# Patient Record
Sex: FEMALE | Race: WHITE | NOT HISPANIC OR LATINO | Employment: FULL TIME
[De-identification: names, ages, dates, MRNs, and addresses within clinical notes are randomized per-mention and may not be internally consistent; named-entity substitution may affect disease eponyms.]

---

## 2017-06-10 ENCOUNTER — HOSPITAL (OUTPATIENT)
Dept: OTHER | Age: 32
End: 2017-06-10
Attending: EMERGENCY MEDICINE

## 2017-06-10 LAB
ALBUMIN SERPL-MCNC: 4.5 GM/DL (ref 3.6–5.1)
ALBUMIN/GLOB SERPL: 1.2 {RATIO} (ref 1–2.4)
ALP SERPL-CCNC: 121 UNIT/L (ref 45–117)
ALT SERPL-CCNC: 23 UNIT/L
ANALYZER ANC (IANC): ABNORMAL
ANION GAP SERPL CALC-SCNC: 16 MMOL/L (ref 10–20)
APPEARANCE UR: CLEAR
AST SERPL-CCNC: 19 UNIT/L
BACTERIA #/AREA URNS HPF: ABNORMAL /HPF
BASOPHILS # BLD: 0 THOUSAND/MCL (ref 0–0.3)
BASOPHILS NFR BLD: 0 %
BILIRUB SERPL-MCNC: 0.7 MG/DL (ref 0.2–1)
BILIRUB UR QL: NEGATIVE
BUN SERPL-MCNC: 16 MG/DL (ref 6–20)
BUN/CREAT SERPL: 23 (ref 7–25)
CALCIUM SERPL-MCNC: 9.7 MG/DL (ref 8.4–10.2)
CHLORIDE: 100 MMOL/L (ref 98–107)
CO2 SERPL-SCNC: 27 MMOL/L (ref 21–32)
COLOR UR: YELLOW
CREAT SERPL-MCNC: 0.71 MG/DL (ref 0.51–0.95)
DIFFERENTIAL METHOD BLD: ABNORMAL
EOSINOPHIL # BLD: 0 THOUSAND/MCL (ref 0.1–0.5)
EOSINOPHIL NFR BLD: 0 %
ERYTHROCYTE [DISTWIDTH] IN BLOOD: 12.4 % (ref 11–15)
GLOBULIN SER-MCNC: 3.9 GM/DL (ref 2–4)
GLUCOSE SERPL-MCNC: 86 MG/DL (ref 65–99)
GLUCOSE UR-MCNC: NEGATIVE MG/DL
HCG SERPL QL: NEGATIVE
HEMATOCRIT: 43.2 % (ref 36–46.5)
HGB BLD-MCNC: 14.5 GM/DL (ref 12–15.5)
HGB UR QL: ABNORMAL
HYALINE CASTS #/AREA URNS LPF: ABNORMAL /LPF (ref 0–5)
KETONES UR-MCNC: NEGATIVE MG/DL
LEUKOCYTE ESTERASE UR QL STRIP: ABNORMAL
LIPASE SERPL-CCNC: 378 UNIT/L (ref 73–393)
LYMPHOCYTES # BLD: 2.7 THOUSAND/MCL (ref 1–4.8)
LYMPHOCYTES NFR BLD: 19 %
MCH RBC QN AUTO: 29.5 PG (ref 26–34)
MCHC RBC AUTO-ENTMCNC: 33.6 GM/DL (ref 32–36.5)
MCV RBC AUTO: 88 FL (ref 78–100)
MONOCYTES # BLD: 1.2 THOUSAND/MCL (ref 0.3–0.9)
MONOCYTES NFR BLD: 8 %
NEUTROPHILS # BLD: 10.3 THOUSAND/MCL (ref 1.8–7.7)
NEUTROPHILS NFR BLD: 73 %
NEUTS SEG NFR BLD: ABNORMAL %
NITRITE UR QL: NEGATIVE
PERCENT NRBC: ABNORMAL
PH UR: 7 UNIT (ref 5–7)
PLATELET # BLD: 305 THOUSAND/MCL (ref 140–450)
POTASSIUM SERPL-SCNC: 4 MMOL/L (ref 3.4–5.1)
PROT SERPL-MCNC: 8.4 GM/DL (ref 6.4–8.2)
PROT UR QL: NEGATIVE MG/DL
RBC # BLD: 4.91 MILLION/MCL (ref 4–5.2)
RBC #/AREA URNS HPF: ABNORMAL /HPF (ref 0–3)
SODIUM SERPL-SCNC: 139 MMOL/L (ref 135–145)
SP GR UR: <1.005 (ref 1–1.03)
SPECIMEN SOURCE: ABNORMAL
SQUAMOUS #/AREA URNS HPF: ABNORMAL /HPF (ref 0–5)
UROBILINOGEN UR QL: 0.2 MG/DL (ref 0–1)
WBC # BLD: 14.2 THOUSAND/MCL (ref 4.2–11)
WBC #/AREA URNS HPF: ABNORMAL /HPF (ref 0–5)

## 2017-06-13 LAB
ALBUMIN SERPL-MCNC: 3.9 GM/DL (ref 3.6–5.1)
ALBUMIN/GLOB SERPL: 0.8 {RATIO} (ref 1–2.4)
ALP SERPL-CCNC: 99 UNIT/L (ref 45–117)
ALT SERPL-CCNC: 21 UNIT/L
AMORPH SED URNS QL MICRO: ABNORMAL
ANALYZER ANC (IANC): ABNORMAL
ANION GAP SERPL CALC-SCNC: 12 MMOL/L (ref 10–20)
APPEARANCE UR: ABNORMAL
AST SERPL-CCNC: 18 UNIT/L
BACTERIA #/AREA URNS HPF: ABNORMAL /HPF
BASOPHILS # BLD: 0 THOUSAND/MCL (ref 0–0.3)
BASOPHILS NFR BLD: 0 %
BILIRUB SERPL-MCNC: 0.6 MG/DL (ref 0.2–1)
BILIRUB UR QL: NEGATIVE
BUN SERPL-MCNC: 13 MG/DL (ref 6–20)
BUN/CREAT SERPL: 17 (ref 7–25)
CALCIUM SERPL-MCNC: 9 MG/DL (ref 8.4–10.2)
CAOX CRY URNS QL MICRO: ABNORMAL
CHLORIDE: 100 MMOL/L (ref 98–107)
CO2 SERPL-SCNC: 29 MMOL/L (ref 21–32)
COLOR UR: ABNORMAL
CREAT SERPL-MCNC: 0.78 MG/DL (ref 0.51–0.95)
DIFFERENTIAL METHOD BLD: ABNORMAL
EOSINOPHIL # BLD: 0 THOUSAND/MCL (ref 0.1–0.5)
EOSINOPHIL NFR BLD: 0 %
EPITH CASTS #/AREA URNS LPF: ABNORMAL /[LPF]
ERYTHROCYTE [DISTWIDTH] IN BLOOD: 12.2 % (ref 11–15)
FATTY CASTS #/AREA URNS LPF: ABNORMAL /[LPF]
GLOBULIN SER-MCNC: 4.6 GM/DL (ref 2–4)
GLUCOSE SERPL-MCNC: 101 MG/DL (ref 65–99)
GLUCOSE UR-MCNC: NEGATIVE MG/DL
GRAN CASTS #/AREA URNS LPF: ABNORMAL /[LPF]
HCG POINT OF CARE (5HGRST): NEGATIVE
HEMATOCRIT: 39.9 % (ref 36–46.5)
HG POINT OF CARE QC: NORMAL
HGB BLD-MCNC: 13.4 GM/DL (ref 12–15.5)
HGB UR QL: ABNORMAL
HYALINE CASTS #/AREA URNS LPF: ABNORMAL /LPF (ref 0–5)
KETONES UR-MCNC: NEGATIVE MG/DL
LEUKOCYTE ESTERASE UR QL STRIP: ABNORMAL
LYMPHOCYTES # BLD: 2.3 THOUSAND/MCL (ref 1–4.8)
LYMPHOCYTES NFR BLD: 16 %
MCH RBC QN AUTO: 29.8 PG (ref 26–34)
MCHC RBC AUTO-ENTMCNC: 33.6 GM/DL (ref 32–36.5)
MCV RBC AUTO: 88.9 FL (ref 78–100)
MIXED CELL CASTS #/AREA URNS LPF: ABNORMAL /[LPF]
MONOCYTES # BLD: 1.1 THOUSAND/MCL (ref 0.3–0.9)
MONOCYTES NFR BLD: 7 %
MUCOUS THREADS URNS QL MICRO: ABNORMAL
NEUTROPHILS # BLD: 11.2 THOUSAND/MCL (ref 1.8–7.7)
NEUTROPHILS NFR BLD: 77 %
NEUTS SEG NFR BLD: ABNORMAL %
NITRITE UR QL: NEGATIVE
PERCENT NRBC: ABNORMAL
PH UR: 6 UNIT (ref 5–7)
PLATELET # BLD: 293 THOUSAND/MCL (ref 140–450)
POTASSIUM SERPL-SCNC: 3.6 MMOL/L (ref 3.4–5.1)
PROT SERPL-MCNC: 8.5 GM/DL (ref 6.4–8.2)
PROT UR QL: NEGATIVE MG/DL
RBC # BLD: 4.49 MILLION/MCL (ref 4–5.2)
RBC #/AREA URNS HPF: ABNORMAL /HPF (ref 0–3)
RBC CASTS #/AREA URNS LPF: ABNORMAL /[LPF]
RENAL EPI CELLS #/AREA URNS HPF: ABNORMAL /[HPF]
SODIUM SERPL-SCNC: 137 MMOL/L (ref 135–145)
SP GR UR: 1 (ref 1–1.03)
SPECIMEN SOURCE: ABNORMAL
SPERM URNS QL MICRO: ABNORMAL
SQUAMOUS #/AREA URNS HPF: ABNORMAL /HPF (ref 0–5)
T VAGINALIS URNS QL MICRO: ABNORMAL
TRI-PHOS CRY URNS QL MICRO: ABNORMAL
URATE CRY URNS QL MICRO: ABNORMAL
URINE REFLEX: ABNORMAL
URNS CMNT MICRO: ABNORMAL
UROBILINOGEN UR QL: 0.2 MG/DL (ref 0–1)
WAXY CASTS #/AREA URNS LPF: ABNORMAL /[LPF]
WBC # BLD: 14.7 THOUSAND/MCL (ref 4.2–11)
WBC #/AREA URNS HPF: ABNORMAL /HPF (ref 0–5)
WBC CASTS #/AREA URNS LPF: ABNORMAL /[LPF]
YEAST HYPHAE URNS QL MICRO: ABNORMAL
YEAST URNS QL MICRO: ABNORMAL

## 2017-06-14 ENCOUNTER — HOSPITAL (OUTPATIENT)
Dept: OTHER | Age: 32
End: 2017-06-14
Attending: INTERNAL MEDICINE

## 2017-06-14 LAB
ANALYZER ANC (IANC): ABNORMAL
ANION GAP SERPL CALC-SCNC: 16 MMOL/L (ref 10–20)
BASOPHILS # BLD: 0 THOUSAND/MCL (ref 0–0.3)
BASOPHILS NFR BLD: 0 %
BUN SERPL-MCNC: 10 MG/DL (ref 6–20)
BUN/CREAT SERPL: 14 (ref 7–25)
CALCIUM SERPL-MCNC: 8.4 MG/DL (ref 8.4–10.2)
CHLORIDE: 106 MMOL/L (ref 98–107)
CO2 SERPL-SCNC: 25 MMOL/L (ref 21–32)
CREAT SERPL-MCNC: 0.71 MG/DL (ref 0.51–0.95)
DIFFERENTIAL METHOD BLD: ABNORMAL
EOSINOPHIL # BLD: 0.1 THOUSAND/MCL (ref 0.1–0.5)
EOSINOPHIL NFR BLD: 1 %
ERYTHROCYTE [DISTWIDTH] IN BLOOD: 12.3 % (ref 11–15)
GLUCOSE SERPL-MCNC: 92 MG/DL (ref 65–99)
HEMATOCRIT: 36.2 % (ref 36–46.5)
HGB BLD-MCNC: 11.7 GM/DL (ref 12–15.5)
LYMPHOCYTES # BLD: 1.9 THOUSAND/MCL (ref 1–4.8)
LYMPHOCYTES NFR BLD: 21 %
MCH RBC QN AUTO: 28.9 PG (ref 26–34)
MCHC RBC AUTO-ENTMCNC: 32.3 GM/DL (ref 32–36.5)
MCV RBC AUTO: 89.4 FL (ref 78–100)
MONOCYTES # BLD: 0.8 THOUSAND/MCL (ref 0.3–0.9)
MONOCYTES NFR BLD: 9 %
NEUTROPHILS # BLD: 6.1 THOUSAND/MCL (ref 1.8–7.7)
NEUTROPHILS NFR BLD: 69 %
NEUTS SEG NFR BLD: ABNORMAL %
PERCENT NRBC: ABNORMAL
PLATELET # BLD: 252 THOUSAND/MCL (ref 140–450)
POTASSIUM SERPL-SCNC: 4.1 MMOL/L (ref 3.4–5.1)
RBC # BLD: 4.05 MILLION/MCL (ref 4–5.2)
SODIUM SERPL-SCNC: 143 MMOL/L (ref 135–145)
TSH SERPL-ACNC: 2.04 MCUNIT/ML (ref 0.35–5)
WBC # BLD: 8.8 THOUSAND/MCL (ref 4.2–11)

## 2017-06-15 LAB
ANALYZER ANC (IANC): ABNORMAL
ANION GAP SERPL CALC-SCNC: 12 MMOL/L (ref 10–20)
BUN SERPL-MCNC: 11 MG/DL (ref 6–20)
BUN/CREAT SERPL: 17 (ref 7–25)
CALCIUM SERPL-MCNC: 8.3 MG/DL (ref 8.4–10.2)
CHLORIDE: 103 MMOL/L (ref 98–107)
CO2 SERPL-SCNC: 27 MMOL/L (ref 21–32)
CREAT SERPL-MCNC: 0.63 MG/DL (ref 0.51–0.95)
ERYTHROCYTE [DISTWIDTH] IN BLOOD: 12.1 % (ref 11–15)
GLUCOSE SERPL-MCNC: 91 MG/DL (ref 65–99)
HEMATOCRIT: 35.8 % (ref 36–46.5)
HGB BLD-MCNC: 11.7 GM/DL (ref 12–15.5)
MAGNESIUM SERPL-MCNC: 1.9 MG/DL (ref 1.7–2.4)
MCH RBC QN AUTO: 29.1 PG (ref 26–34)
MCHC RBC AUTO-ENTMCNC: 32.7 GM/DL (ref 32–36.5)
MCV RBC AUTO: 89.1 FL (ref 78–100)
PHOSPHATE SERPL-MCNC: 4.3 MG/DL (ref 2.4–4.7)
PLATELET # BLD: 264 THOUSAND/MCL (ref 140–450)
POTASSIUM SERPL-SCNC: 3.6 MMOL/L (ref 3.4–5.1)
RBC # BLD: 4.02 MILLION/MCL (ref 4–5.2)
SODIUM SERPL-SCNC: 138 MMOL/L (ref 135–145)
WBC # BLD: 7.1 THOUSAND/MCL (ref 4.2–11)

## 2018-06-24 ENCOUNTER — DIAGNOSTIC TRANS (OUTPATIENT)
Dept: OTHER | Age: 33
End: 2018-06-24

## 2018-06-24 ENCOUNTER — HOSPITAL (OUTPATIENT)
Dept: OTHER | Age: 33
End: 2018-06-24
Attending: EMERGENCY MEDICINE

## 2018-06-24 LAB
ALBUMIN SERPL-MCNC: 4.2 GM/DL (ref 3.6–5.1)
ALBUMIN/GLOB SERPL: 1.1 {RATIO} (ref 1–2.4)
ALP SERPL-CCNC: 88 UNIT/L (ref 45–117)
ALT SERPL-CCNC: 20 UNIT/L
ANALYZER ANC (IANC): NORMAL
ANION GAP SERPL CALC-SCNC: 11 MMOL/L (ref 10–20)
AST SERPL-CCNC: 11 UNIT/L
BASOPHILS # BLD: 0 THOUSAND/MCL (ref 0–0.3)
BASOPHILS NFR BLD: 0 %
BILIRUB SERPL-MCNC: 0.7 MG/DL (ref 0.2–1)
BUN SERPL-MCNC: 12 MG/DL (ref 6–20)
BUN/CREAT SERPL: 18 (ref 7–25)
CALCIUM SERPL-MCNC: 9 MG/DL (ref 8.4–10.2)
CHLORIDE: 107 MMOL/L (ref 98–107)
CO2 SERPL-SCNC: 25 MMOL/L (ref 21–32)
CREAT SERPL-MCNC: 0.67 MG/DL (ref 0.51–0.95)
DIFFERENTIAL METHOD BLD: NORMAL
EOSINOPHIL # BLD: 0.1 THOUSAND/MCL (ref 0.1–0.5)
EOSINOPHIL NFR BLD: 1 %
ERYTHROCYTE [DISTWIDTH] IN BLOOD: 12.5 % (ref 11–15)
GLOBULIN SER-MCNC: 3.9 GM/DL (ref 2–4)
GLUCOSE SERPL-MCNC: 100 MG/DL (ref 65–99)
HCG POINT OF CARE (5HGRST): NEGATIVE
HEMATOCRIT: 41.4 % (ref 36–46.5)
HGB BLD-MCNC: 13.9 GM/DL (ref 12–15.5)
LYMPHOCYTES # BLD: 2.4 THOUSAND/MCL (ref 1–4.8)
LYMPHOCYTES NFR BLD: 27 %
MCH RBC QN AUTO: 30 PG (ref 26–34)
MCHC RBC AUTO-ENTMCNC: 33.6 GM/DL (ref 32–36.5)
MCV RBC AUTO: 89.2 FL (ref 78–100)
MONOCYTES # BLD: 0.7 THOUSAND/MCL (ref 0.3–0.9)
MONOCYTES NFR BLD: 8 %
NEUTROPHILS # BLD: 5.5 THOUSAND/MCL (ref 1.8–7.7)
NEUTROPHILS NFR BLD: 64 %
NEUTS SEG NFR BLD: NORMAL %
NRBC (NRBCRE): NORMAL
PLATELET # BLD: 257 THOUSAND/MCL (ref 140–450)
POTASSIUM SERPL-SCNC: 3.8 MMOL/L (ref 3.4–5.1)
PROT SERPL-MCNC: 8.1 GM/DL (ref 6.4–8.2)
RBC # BLD: 4.64 MILLION/MCL (ref 4–5.2)
SODIUM SERPL-SCNC: 139 MMOL/L (ref 135–145)
TROPONIN I SERPL HS-MCNC: <0.02 NG/ML
WBC # BLD: 8.7 THOUSAND/MCL (ref 4.2–11)

## 2020-03-20 ENCOUNTER — HOSPITAL (OUTPATIENT)
Dept: OTHER | Age: 35
End: 2020-03-20

## 2020-03-20 PROCEDURE — X1094 NO CHARGE VISIT: HCPCS | Performed by: EMERGENCY MEDICINE

## 2020-04-29 ENCOUNTER — HOSPITAL (OUTPATIENT)
Dept: OTHER | Age: 35
End: 2020-04-29
Attending: ORTHOPAEDIC SURGERY

## 2020-05-02 ENCOUNTER — HOSPITAL (OUTPATIENT)
Dept: OTHER | Age: 35
End: 2020-05-02

## 2020-05-02 ENCOUNTER — DIAGNOSTIC TRANS (OUTPATIENT)
Dept: OTHER | Age: 35
End: 2020-05-02

## 2020-05-02 PROCEDURE — 93010 ELECTROCARDIOGRAM REPORT: CPT | Performed by: INTERNAL MEDICINE

## 2020-05-03 LAB
ANALYZER ANC (IANC): ABNORMAL
ANION GAP SERPL CALC-SCNC: 10 MMOL/L (ref 10–20)
BASOPHILS # BLD: 0.1 K/MCL (ref 0–0.3)
BASOPHILS NFR BLD: 1 %
BUN SERPL-MCNC: 12 MG/DL (ref 6–20)
BUN/CREAT SERPL: 15 (ref 7–25)
CALCIUM SERPL-MCNC: 9.5 MG/DL (ref 8.4–10.2)
CHLORIDE SERPL-SCNC: 102 MMOL/L (ref 98–107)
CO2 SERPL-SCNC: 30 MMOL/L (ref 21–32)
CREAT SERPL-MCNC: 0.81 MG/DL (ref 0.51–0.95)
DIFFERENTIAL METHOD BLD: ABNORMAL
EOSINOPHIL # BLD: 0 K/MCL (ref 0.1–0.5)
EOSINOPHIL NFR BLD: 0 %
ERYTHROCYTE [DISTWIDTH] IN BLOOD: 12 % (ref 11–15)
GLUCOSE SERPL-MCNC: 99 MG/DL (ref 65–99)
HCG POINT OF CARE (5HGRST): NEGATIVE
HCT VFR BLD CALC: 45 % (ref 36–46.5)
HGB BLD-MCNC: 14.8 G/DL (ref 12–15.5)
IMM GRANULOCYTES # BLD AUTO: 0 K/MCL (ref 0–0.2)
IMM GRANULOCYTES NFR BLD: 0 %
LYMPHOCYTES # BLD: 2.1 K/MCL (ref 1–4.8)
LYMPHOCYTES NFR BLD: 23 %
MAGNESIUM SERPL-MCNC: 2.4 MG/DL (ref 1.7–2.4)
MCH RBC QN AUTO: 28.8 PG (ref 26–34)
MCHC RBC AUTO-ENTMCNC: 32.9 G/DL (ref 32–36.5)
MCV RBC AUTO: 87.5 FL (ref 78–100)
MONOCYTES # BLD: 0.8 K/MCL (ref 0.3–0.9)
MONOCYTES NFR BLD: 9 %
NEUTROPHILS # BLD: 6.5 K/MCL (ref 1.8–7.7)
NEUTROPHILS NFR BLD: 67 %
NEUTS SEG NFR BLD: ABNORMAL %
NRBC (NRBCRE): 0 /100 WBC
PLATELET # BLD: 274 K/MCL (ref 140–450)
POTASSIUM SERPL-SCNC: 3.3 MMOL/L (ref 3.4–5.1)
RBC # BLD: 5.14 MIL/MCL (ref 4–5.2)
SODIUM SERPL-SCNC: 139 MMOL/L (ref 135–145)
TROPONIN I SERPL HS-MCNC: <0.02 NG/ML
WBC # BLD: 9.5 K/MCL (ref 4.2–11)

## 2020-05-03 PROCEDURE — 99285 EMERGENCY DEPT VISIT HI MDM: CPT | Performed by: EMERGENCY MEDICINE

## 2020-05-07 ENCOUNTER — HOSPITAL (OUTPATIENT)
Dept: OTHER | Age: 35
End: 2020-05-07
Attending: ORTHOPAEDIC SURGERY

## 2020-07-02 ENCOUNTER — APPOINTMENT (OUTPATIENT)
Dept: ULTRASOUND IMAGING | Age: 35
End: 2020-07-02
Attending: PHYSICIAN ASSISTANT

## 2020-07-02 ENCOUNTER — HOSPITAL ENCOUNTER (EMERGENCY)
Age: 35
Discharge: HOME | End: 2020-07-02
Attending: EMERGENCY MEDICINE

## 2020-07-02 VITALS
OXYGEN SATURATION: 100 % | RESPIRATION RATE: 16 BRPM | WEIGHT: 152.34 LBS | SYSTOLIC BLOOD PRESSURE: 124 MMHG | HEIGHT: 60 IN | BODY MASS INDEX: 29.91 KG/M2 | DIASTOLIC BLOOD PRESSURE: 88 MMHG | HEART RATE: 69 BPM | TEMPERATURE: 98.2 F

## 2020-07-02 DIAGNOSIS — N92.1 MENOMETRORRHAGIA: Primary | ICD-10-CM

## 2020-07-02 LAB
ALBUMIN SERPL-MCNC: 4.5 G/DL (ref 3.6–5.1)
ALBUMIN/GLOB SERPL: 1 {RATIO} (ref 1–2.4)
ALP SERPL-CCNC: 116 UNITS/L (ref 45–117)
ALT SERPL-CCNC: 35 UNITS/L
ANION GAP SERPL CALC-SCNC: 10 MMOL/L (ref 10–20)
APPEARANCE UR: CLEAR
AST SERPL-CCNC: 21 UNITS/L
BACTERIA #/AREA URNS HPF: ABNORMAL /HPF
BASOPHILS # BLD: 0 K/MCL (ref 0–0.3)
BASOPHILS NFR BLD: 0 %
BILIRUB SERPL-MCNC: 0.8 MG/DL (ref 0.2–1)
BILIRUB UR QL STRIP: NEGATIVE
BUN SERPL-MCNC: 13 MG/DL (ref 6–20)
BUN/CREAT SERPL: 18 (ref 7–25)
CALCIUM SERPL-MCNC: 9.6 MG/DL (ref 8.4–10.2)
CHLORIDE SERPL-SCNC: 103 MMOL/L (ref 98–107)
CO2 SERPL-SCNC: 27 MMOL/L (ref 21–32)
COLOR UR: COLORLESS
CREAT SERPL-MCNC: 0.71 MG/DL (ref 0.51–0.95)
DIFFERENTIAL METHOD BLD: NORMAL
EOSINOPHIL # BLD: 0.1 K/MCL (ref 0.1–0.5)
EOSINOPHIL NFR BLD: 1 %
ERYTHROCYTE [DISTWIDTH] IN BLOOD: 12 % (ref 11–15)
GLOBULIN SER-MCNC: 4.7 G/DL (ref 2–4)
GLUCOSE SERPL-MCNC: 88 MG/DL (ref 65–99)
GLUCOSE UR STRIP-MCNC: NEGATIVE MG/DL
HCG UR QL: NEGATIVE
HCT VFR BLD CALC: 45.7 % (ref 36–46.5)
HGB BLD-MCNC: 15.3 G/DL (ref 12–15.5)
HGB UR QL STRIP: ABNORMAL
HYALINE CASTS #/AREA URNS LPF: ABNORMAL /LPF (ref 0–5)
IMM GRANULOCYTES # BLD AUTO: 0 K/MCL (ref 0–0.2)
IMM GRANULOCYTES NFR BLD: 0 %
KETONES UR STRIP-MCNC: NEGATIVE MG/DL
LEUKOCYTE ESTERASE UR QL STRIP: NEGATIVE
LYMPHOCYTES # BLD: 1.8 K/MCL (ref 1–4.8)
LYMPHOCYTES NFR BLD: 19 %
MCH RBC QN AUTO: 29.8 PG (ref 26–34)
MCHC RBC AUTO-ENTMCNC: 33.5 G/DL (ref 32–36.5)
MCV RBC AUTO: 88.9 FL (ref 78–100)
MONOCYTES # BLD: 0.7 K/MCL (ref 0.3–0.9)
MONOCYTES NFR BLD: 7 %
NEUTROPHILS # BLD: 6.7 K/MCL (ref 1.8–7.7)
NEUTROPHILS NFR BLD: 73 %
NITRITE UR QL STRIP: NEGATIVE
NRBC BLD MANUAL-RTO: 0 /100 WBC
PH UR STRIP: 7 UNITS (ref 5–7)
PLATELET # BLD: 285 K/MCL (ref 140–450)
POTASSIUM SERPL-SCNC: 3.7 MMOL/L (ref 3.4–5.1)
PROT SERPL-MCNC: 9.2 G/DL (ref 6.4–8.2)
PROT UR STRIP-MCNC: NEGATIVE MG/DL
RBC # BLD: 5.14 MIL/MCL (ref 4–5.2)
RBC #/AREA URNS HPF: ABNORMAL /HPF (ref 0–2)
SODIUM SERPL-SCNC: 136 MMOL/L (ref 135–145)
SP GR UR STRIP: <1.005 (ref 1–1.03)
SPECIMEN SOURCE: ABNORMAL
SQUAMOUS #/AREA URNS HPF: ABNORMAL /HPF (ref 0–5)
UROBILINOGEN UR STRIP-MCNC: 0.2 MG/DL (ref 0–1)
WBC # BLD: 9.2 K/MCL (ref 4.2–11)
WBC #/AREA URNS HPF: ABNORMAL /HPF (ref 0–5)

## 2020-07-02 PROCEDURE — 36415 COLL VENOUS BLD VENIPUNCTURE: CPT

## 2020-07-02 PROCEDURE — 85025 COMPLETE CBC W/AUTO DIFF WBC: CPT

## 2020-07-02 PROCEDURE — 80053 COMPREHEN METABOLIC PANEL: CPT

## 2020-07-02 PROCEDURE — 99284 EMERGENCY DEPT VISIT MOD MDM: CPT

## 2020-07-02 PROCEDURE — 81001 URINALYSIS AUTO W/SCOPE: CPT

## 2020-07-02 PROCEDURE — 84703 CHORIONIC GONADOTROPIN ASSAY: CPT

## 2020-07-02 SDOH — HEALTH STABILITY: MENTAL HEALTH: HOW OFTEN DO YOU HAVE A DRINK CONTAINING ALCOHOL?: NEVER

## 2020-07-02 ASSESSMENT — PAIN SCALES - GENERAL
PAINLEVEL_OUTOF10: 5
PAINLEVEL_OUTOF10: 3

## 2021-10-25 ENCOUNTER — HOSPITAL ENCOUNTER (EMERGENCY)
Age: 36
Discharge: HOME | End: 2021-10-25

## 2021-10-25 ENCOUNTER — APPOINTMENT (OUTPATIENT)
Dept: GENERAL RADIOLOGY | Age: 36
End: 2021-10-25
Attending: PHYSICIAN ASSISTANT

## 2021-10-25 ENCOUNTER — APPOINTMENT (OUTPATIENT)
Dept: CT IMAGING | Age: 36
End: 2021-10-25
Attending: PHYSICIAN ASSISTANT

## 2021-10-25 VITALS
BODY MASS INDEX: 22.19 KG/M2 | HEIGHT: 60 IN | OXYGEN SATURATION: 99 % | HEART RATE: 62 BPM | DIASTOLIC BLOOD PRESSURE: 87 MMHG | WEIGHT: 113 LBS | TEMPERATURE: 98.4 F | RESPIRATION RATE: 16 BRPM | SYSTOLIC BLOOD PRESSURE: 128 MMHG

## 2021-10-25 DIAGNOSIS — S06.0X0A CONCUSSION WITHOUT LOSS OF CONSCIOUSNESS, INITIAL ENCOUNTER: Primary | ICD-10-CM

## 2021-10-25 PROCEDURE — 70450 CT HEAD/BRAIN W/O DYE: CPT

## 2021-10-25 PROCEDURE — G1004 CDSM NDSC: HCPCS

## 2021-10-25 PROCEDURE — 72040 X-RAY EXAM NECK SPINE 2-3 VW: CPT

## 2021-10-25 PROCEDURE — 10004651 HB RX, NO CHARGE ITEM: Performed by: PHYSICIAN ASSISTANT

## 2021-10-25 PROCEDURE — 99284 EMERGENCY DEPT VISIT MOD MDM: CPT

## 2021-10-25 RX ORDER — ACETAMINOPHEN 500 MG
1000 TABLET ORAL ONCE
Status: COMPLETED | OUTPATIENT
Start: 2021-10-25 | End: 2021-10-25

## 2021-10-25 RX ORDER — LEVOTHYROXINE SODIUM 88 UG/1
TABLET ORAL
COMMUNITY
Start: 2021-09-30 | End: 2023-06-16 | Stop reason: ALTCHOICE

## 2021-10-25 RX ADMIN — ACETAMINOPHEN 1000 MG: 500 TABLET ORAL at 17:43

## 2021-10-25 ASSESSMENT — PAIN SCALES - GENERAL
PAINLEVEL_OUTOF10: 1
PAINLEVEL_OUTOF10: 3

## 2023-06-13 ENCOUNTER — APPOINTMENT (OUTPATIENT)
Dept: INTERNAL MEDICINE | Age: 38
End: 2023-06-13

## 2023-06-16 ENCOUNTER — OFFICE VISIT (OUTPATIENT)
Dept: INTERNAL MEDICINE | Age: 38
End: 2023-06-16

## 2023-06-16 VITALS
BODY MASS INDEX: 24.39 KG/M2 | DIASTOLIC BLOOD PRESSURE: 72 MMHG | HEART RATE: 69 BPM | RESPIRATION RATE: 14 BRPM | OXYGEN SATURATION: 100 % | SYSTOLIC BLOOD PRESSURE: 122 MMHG | HEIGHT: 60 IN | WEIGHT: 124.2 LBS

## 2023-06-16 DIAGNOSIS — E56.9 VITAMIN DEFICIENCY: Primary | ICD-10-CM

## 2023-06-16 DIAGNOSIS — L98.9 SKIN LESION OF HAND: ICD-10-CM

## 2023-06-16 DIAGNOSIS — L29.9 SKIN PRURITUS: ICD-10-CM

## 2023-06-16 DIAGNOSIS — R53.83 OTHER FATIGUE: ICD-10-CM

## 2023-06-16 DIAGNOSIS — E06.3 HASHIMOTO'S DISEASE: ICD-10-CM

## 2023-06-16 PROCEDURE — 99204 OFFICE O/P NEW MOD 45 MIN: CPT | Performed by: INTERNAL MEDICINE

## 2023-06-16 RX ORDER — CHOLECALCIFEROL (VITAMIN D3) 125 MCG
50 TABLET ORAL DAILY
COMMUNITY

## 2023-06-16 RX ORDER — LEVOTHYROXINE SODIUM 0.1 MG/1
100 TABLET ORAL DAILY
COMMUNITY
Start: 2022-01-01

## 2023-06-16 RX ORDER — MULTIVITAMIN,THER AND MINERALS
3 TABLET ORAL DAILY
COMMUNITY

## 2023-06-16 RX ORDER — CHLORAL HYDRATE 500 MG
2 CAPSULE ORAL DAILY
COMMUNITY

## 2023-06-16 ASSESSMENT — PATIENT HEALTH QUESTIONNAIRE - PHQ9
CLINICAL INTERPRETATION OF PHQ2 SCORE: NO FURTHER SCREENING NEEDED
SUM OF ALL RESPONSES TO PHQ9 QUESTIONS 1 AND 2: 0
1. LITTLE INTEREST OR PLEASURE IN DOING THINGS: NOT AT ALL
SUM OF ALL RESPONSES TO PHQ9 QUESTIONS 1 AND 2: 0
2. FEELING DOWN, DEPRESSED OR HOPELESS: NOT AT ALL

## 2023-06-19 LAB
25(OH)D3+25(OH)D2 SERPL-MCNC: 39 NG/ML (ref 30–100)
ALBUMIN SERPL-MCNC: 4.6 G/DL (ref 3.6–5.1)
ALBUMIN/GLOB SERPL: 1.5 (CALC) (ref 1–2.5)
ALP SERPL-CCNC: 73 U/L (ref 31–125)
ALT SERPL-CCNC: 11 U/L (ref 6–29)
AST SERPL-CCNC: 16 U/L (ref 10–30)
BASOPHILS # BLD: 54 CELLS/UL (ref 0–200)
BASOPHILS NFR BLD: 0.6 %
BILIRUB SERPL-MCNC: 0.9 MG/DL (ref 0.2–1.2)
BUN SERPL-MCNC: 13 MG/DL (ref 7–25)
BUN/CREAT SERPL: NORMAL (CALC) (ref 6–22)
CALCIUM SERPL-MCNC: 9.9 MG/DL (ref 8.6–10.2)
CHLORIDE SERPL-SCNC: 101 MMOL/L (ref 98–110)
CHOLEST SERPL-MCNC: 231 MG/DL
CHOLEST/HDLC SERPL: 3.6 (CALC)
CO2 SERPL-SCNC: 32 MMOL/L (ref 20–32)
CREAT SERPL-MCNC: 0.7 MG/DL (ref 0.5–0.97)
EOSINOPHIL # BLD: 18 CELLS/UL (ref 15–500)
EOSINOPHIL NFR BLD: 0.2 %
ERYTHROCYTE [DISTWIDTH] IN BLOOD: 11.8 % (ref 11–15)
FOLATE SERPL-MCNC: 14.2 NG/ML
GFR SERPLBLD SCHWARTZ-ARVRAT: 114 ML/MIN/1.73M2
GLOBULIN SER-MCNC: 3.1 G/DL (CALC) (ref 1.9–3.7)
GLUCOSE SERPL-MCNC: 79 MG/DL (ref 65–99)
HCT VFR BLD CALC: 44.5 % (ref 35–45)
HDLC SERPL-MCNC: 65 MG/DL
HGB BLD-MCNC: 14.4 G/DL (ref 11.7–15.5)
LAB RESULT: NORMAL
LDLC SERPL CALC-MCNC: 141 MG/DL (CALC)
LENGTH OF FAST TIME PATIENT: YES H
LENGTH OF FAST TIME PATIENT: YES H
LYMPHOCYTES # BLD: 1431 CELLS/UL (ref 850–3900)
LYMPHOCYTES NFR BLD: 15.9 %
MCH RBC QN AUTO: 30.4 PG (ref 27–33)
MCHC RBC AUTO-ENTMCNC: 32.4 G/DL (ref 32–36)
MCV RBC AUTO: 94.1 FL (ref 80–100)
MONOCYTES # BLD: 522 CELLS/UL (ref 200–950)
MONOCYTES NFR BLD: 5.8 %
MPV (OFFPRE2): 11.2 FL (ref 7.5–12.5)
NEUTROPHILS # BLD: 6975 CELLS/UL (ref 1500–7800)
NEUTROPHILS NFR BLD: 77.5 %
NONHDLC SERPL-MCNC: 166 MG/DL (CALC)
PLATELET # BLD: 219 THOUSAND/UL (ref 140–400)
POTASSIUM SERPL-SCNC: 4.3 MMOL/L (ref 3.5–5.3)
PROT SERPL-MCNC: 7.7 G/DL (ref 6.1–8.1)
RBC # BLD: 4.73 MILLION/UL (ref 3.8–5.1)
SODIUM SERPL-SCNC: 139 MMOL/L (ref 135–146)
T3FREE SERPL-MCNC: 3.2 PG/ML (ref 2.3–4.2)
T4 FREE SERPL-MCNC: 1.7 NG/DL (ref 0.8–1.8)
TRIGL SERPL-MCNC: 123 MG/DL
TSH SERPL-ACNC: 0.46 MIU/L
VIT B1 BLD-MCNC: 289 NMOL/L (ref 78–185)
VIT B12 SERPL-MCNC: 932 PG/ML (ref 200–1100)
WBC # BLD: 9 THOUSAND/UL (ref 3.8–10.8)

## 2023-06-26 ENCOUNTER — E-ADVICE (OUTPATIENT)
Dept: INTERNAL MEDICINE | Age: 38
End: 2023-06-26

## 2023-10-30 ENCOUNTER — OFFICE VISIT (OUTPATIENT)
Dept: INTERNAL MEDICINE CLINIC | Facility: CLINIC | Age: 38
End: 2023-10-30

## 2023-10-30 VITALS
BODY MASS INDEX: 25.19 KG/M2 | HEIGHT: 60.3 IN | WEIGHT: 130 LBS | SYSTOLIC BLOOD PRESSURE: 130 MMHG | HEART RATE: 80 BPM | DIASTOLIC BLOOD PRESSURE: 82 MMHG

## 2023-10-30 DIAGNOSIS — Z01.419 ENCOUNTER FOR ROUTINE GYNECOLOGICAL EXAMINATION WITH PAPANICOLAOU SMEAR OF CERVIX: ICD-10-CM

## 2023-10-30 DIAGNOSIS — F41.9 ANXIETY: ICD-10-CM

## 2023-10-30 DIAGNOSIS — Z00.00 PHYSICAL EXAM, ANNUAL: Primary | ICD-10-CM

## 2023-10-30 PROBLEM — F32.A ANXIETY AND DEPRESSION: Status: ACTIVE | Noted: 2022-04-01

## 2023-10-30 PROBLEM — D16.21: Status: ACTIVE | Noted: 2022-04-01

## 2023-10-30 PROCEDURE — 3079F DIAST BP 80-89 MM HG: CPT | Performed by: INTERNAL MEDICINE

## 2023-10-30 PROCEDURE — 3008F BODY MASS INDEX DOCD: CPT | Performed by: INTERNAL MEDICINE

## 2023-10-30 PROCEDURE — 99385 PREV VISIT NEW AGE 18-39: CPT | Performed by: INTERNAL MEDICINE

## 2023-10-30 PROCEDURE — 3075F SYST BP GE 130 - 139MM HG: CPT | Performed by: INTERNAL MEDICINE

## 2023-10-30 RX ORDER — LEVOTHYROXINE SODIUM 0.1 MG/1
100 TABLET ORAL
COMMUNITY

## 2024-01-04 ENCOUNTER — NURSE TRIAGE (OUTPATIENT)
Dept: INTERNAL MEDICINE CLINIC | Facility: CLINIC | Age: 39
End: 2024-01-04

## 2024-01-04 NOTE — TELEPHONE ENCOUNTER
Action Requested: Summary for Provider     []  Critical Lab, Recommendations Needed  [] Need Additional Advice  []   FYI    []   Need Orders  [] Need Medications Sent to Pharmacy  []  Other     SUMMARY: Patient calling with intermittent fast heart rate, not experiencing this today or now. Scheduled for tomorrow with Dr. Lyons for evaluation. No visits available with Dr. Genao. Advised ER if she develops these symptoms today or tomorrow prior to visit time.     Reason for call: Rapid Heart Beat  Onset: last 6 months had had 4-5 episodes of this.     Patient has android phone that monitors heart rate and this is being picked up on that. Patient does get light headed and dizzy when experiences this. Patient has no SOB, chest pain. Again no symptoms today but agreeable to ER if develops.     Reason for Disposition   Heart beating very rapidly (e.g., > 140 / minute) and not present now  (Exception: During exercise.)    Protocols used: Heart Rate and Heartbeat [id number]     pt seen by Pulmonology team and plan is to admit to obtain cxr and follow further labs sent out and continue to work up. plan discussed with Dr. Bardales

## 2024-01-04 NOTE — TELEPHONE ENCOUNTER
Patient scheduled an appt via [employer] for the following concern:      Intermittent fast heart rate.

## 2024-01-05 ENCOUNTER — LAB ENCOUNTER (OUTPATIENT)
Dept: LAB | Age: 39
End: 2024-01-05
Attending: FAMILY MEDICINE
Payer: COMMERCIAL

## 2024-01-05 ENCOUNTER — OFFICE VISIT (OUTPATIENT)
Dept: FAMILY MEDICINE CLINIC | Facility: CLINIC | Age: 39
End: 2024-01-05
Payer: COMMERCIAL

## 2024-01-05 VITALS
SYSTOLIC BLOOD PRESSURE: 134 MMHG | BODY MASS INDEX: 25.96 KG/M2 | DIASTOLIC BLOOD PRESSURE: 79 MMHG | HEART RATE: 86 BPM | RESPIRATION RATE: 15 BRPM | WEIGHT: 134 LBS | HEIGHT: 60.3 IN

## 2024-01-05 DIAGNOSIS — R00.0 TACHYCARDIA: ICD-10-CM

## 2024-01-05 DIAGNOSIS — R00.0 TACHYCARDIA: Primary | ICD-10-CM

## 2024-01-05 DIAGNOSIS — R42 INTERMITTENT LIGHTHEADEDNESS: ICD-10-CM

## 2024-01-05 LAB
ATRIAL RATE: 65 BPM
DEPRECATED RDW RBC AUTO: 38.6 FL (ref 35.1–46.3)
ERYTHROCYTE [DISTWIDTH] IN BLOOD BY AUTOMATED COUNT: 11.8 % (ref 11–15)
HCT VFR BLD AUTO: 40 %
HGB BLD-MCNC: 13.6 G/DL
MCH RBC QN AUTO: 30.6 PG (ref 26–34)
MCHC RBC AUTO-ENTMCNC: 34 G/DL (ref 31–37)
MCV RBC AUTO: 90.1 FL
P AXIS: 2 DEGREES
P-R INTERVAL: 128 MS
PLATELET # BLD AUTO: 278 10(3)UL (ref 150–450)
Q-T INTERVAL: 404 MS
QRS DURATION: 84 MS
QTC CALCULATION (BEZET): 420 MS
R AXIS: 67 DEGREES
RBC # BLD AUTO: 4.44 X10(6)UL
T AXIS: 49 DEGREES
TSI SER-ACNC: 0.34 MIU/ML (ref 0.55–4.78)
VENTRICULAR RATE: 65 BPM
WBC # BLD AUTO: 7.8 X10(3) UL (ref 4–11)

## 2024-01-05 PROCEDURE — 3008F BODY MASS INDEX DOCD: CPT | Performed by: FAMILY MEDICINE

## 2024-01-05 PROCEDURE — 85027 COMPLETE CBC AUTOMATED: CPT

## 2024-01-05 PROCEDURE — 3075F SYST BP GE 130 - 139MM HG: CPT | Performed by: FAMILY MEDICINE

## 2024-01-05 PROCEDURE — 99214 OFFICE O/P EST MOD 30 MIN: CPT | Performed by: FAMILY MEDICINE

## 2024-01-05 PROCEDURE — 93010 ELECTROCARDIOGRAM REPORT: CPT | Performed by: INTERNAL MEDICINE

## 2024-01-05 PROCEDURE — 93005 ELECTROCARDIOGRAM TRACING: CPT

## 2024-01-05 PROCEDURE — 84443 ASSAY THYROID STIM HORMONE: CPT

## 2024-01-05 PROCEDURE — 3078F DIAST BP <80 MM HG: CPT | Performed by: FAMILY MEDICINE

## 2024-01-05 PROCEDURE — 36415 COLL VENOUS BLD VENIPUNCTURE: CPT

## 2024-01-05 NOTE — PROGRESS NOTES
Mariza Cullen is a 38 year old female      CC:    Chief Complaint   Patient presents with    Rapid Heart Beat     On and off for the past couple of years.          HPI:   Hx of LUIS ALFREDO and hashimotos   A few months ago had a panic attack and HR went up to 180s on her smart watch  Was looking back and did notice that there were times when her heart rate would go up to 130s-140s and then back to down to 70s-80  Has hx of lightheaded/dizziness/vision change/off balance for a min-2 over the last few years.  Typically occurs once a week to every other week.   Often feels that heart is racing, but has always attributed to anxiety   Can happen when doing anything it.   About 5months ago started to walk on the treadmill x 20-30min and moderate strength training.  4-5x per week. There have a few times has to stop due to feeling dizzy     Periods - regular, last 2.5days.  the last period was 4-5days.  Not heavy     +constipation (hx of hypertonic pelvis)    Sees Dr. Young (functional medicine) for management of thyroid diease           ROS:   As above     History:  Past Medical History:   Diagnosis Date    Anxiety     Depression     Hair abnormality     Polycystic disease, ovaries       Past Surgical History:   Procedure Laterality Date    OTHER SURGICAL HISTORY      Osteoid osteoma righ femur  x 3 -most recent 2022,2017x 2, also dental      Family History   Problem Relation Age of Onset    Other (Other) Mother         stenosis of the cervix    Lipids Mother     Hypertension Father     Other (congential heart defect) Daughter         passed away at 7.5months      Family Status   Relation Status    Mo (Not Specified)    Fa (Not Specified)    Rk       Social History     Socioeconomic History    Marital status:    Tobacco Use    Smoking status: Never    Smokeless tobacco: Never   Vaping Use    Vaping Use: Never used   Substance and Sexual Activity    Alcohol use: Never    Drug use: Never          Current  Meds:  Current Outpatient Medications   Medication Sig Dispense Refill    Prenatal Vit-Fe Fumarate-FA (PRENATAL OR) Take by mouth.      levothyroxine 100 MCG Oral Tab Take 1 tablet (100 mcg total) by mouth before breakfast. Levoxyl given by dr prince shore      NON FORMULARY Calm support      NON FORMULARY Mocucare      Lactobacillus (PROBIOTIC ACIDOPHILUS OR) Take by mouth. probioMed      NON FORMULARY Kristian trex      Omega-3 Fatty Acids (FISH OIL EXTRA STRENGTH OR) Take by mouth.        Allergies:  Allergies   Allergen Reactions    Dander HIVES    Dust HIVES    Garlic HIVES    Gluten Meal HIVES    Grass Coughing, OTHER (SEE COMMENTS), SWELLING and Runny nose    Orange HIVES and OTHER (SEE COMMENTS)    Other HIVES and UNKNOWN     MSG    Grass, trees, bushes    Corn NAUSEA AND VOMITING    Tomato NAUSEA AND VOMITING, MYALGIA and OTHER (SEE COMMENTS)     nausea    Monosodium Glutamate OTHER (SEE COMMENTS)     states she had a gran mal seizure r/t msg consumption    Peas UNKNOWN    Corn Oil DIARRHEA    Soy Allergy DIARRHEA              Vitals: /79   Pulse 86   Resp 15   Ht 5' 0.3\" (1.532 m)   Wt 134 lb (60.8 kg)   LMP 12/29/2023 (Exact Date)   BMI 25.91 kg/m²           Physical Exam:  Physical Exam  Constitutional:       General: She is not in acute distress.     Appearance: Normal appearance. She is well-developed. She is not ill-appearing.   Eyes:      Conjunctiva/sclera: Conjunctivae normal.   Neck:      Thyroid: No thyroid mass, thyromegaly or thyroid tenderness.   Cardiovascular:      Rate and Rhythm: Normal rate and regular rhythm.      Heart sounds: Normal heart sounds.   Pulmonary:      Effort: Pulmonary effort is normal.      Breath sounds: Normal breath sounds.   Skin:     General: Skin is warm and dry.      Capillary Refill: Capillary refill takes 2 to 3 seconds.   Neurological:      Mental Status: She is alert and oriented to person, place, and time.   Psychiatric:         Mood and Affect:  Mood normal.          Assessment & Plan:     Mariza Cullen is a 38 year old female with a history of PCOS, Hashimoto's thyroiditis here with intermittent episodes of tachycardia into the 130s to 140s as well as history of dizziness lightheadedness presyncopal symptoms that have been ongoing for the last few years.  Patient is unsure if this is related to the tachycardia.  Patient has had 2-3 presyncopal symptoms while exercising.  Will check CBC, TSH and EKG.  Patient also completed Holter monitor.  Discussed with her that pending results will determine next steps pending referral to cardiology.  Patient expressed understanding.    Problem List Items Addressed This Visit    None  Visit Diagnoses       Tachycardia    -  Primary    Relevant Medications    Prenatal Vit-Fe Fumarate-FA (PRENATAL OR)    Other Relevant Orders    CBC, Platelet; No Differential    Assay, Thyroid Stim Hormone    EKG 12 Lead to be performed at [facility]    CARD MONITOR HOLTER ZIO 8-14 DAYS (CPT=93246/98343)    Intermittent lightheadedness        Relevant Orders    EKG 12 Lead to be performed at [facility]    CARD MONITOR HOLTER ZIO 8-14 DAYS (CPT=93246/83098)               Cheri Lyons DO  01/05/24  2:37 PM

## 2024-01-06 ENCOUNTER — PATIENT MESSAGE (OUTPATIENT)
Dept: FAMILY MEDICINE CLINIC | Facility: CLINIC | Age: 39
End: 2024-01-06

## 2024-01-08 ENCOUNTER — HOSPITAL ENCOUNTER (OUTPATIENT)
Dept: CV DIAGNOSTICS | Facility: HOSPITAL | Age: 39
Discharge: HOME | End: 2024-01-08
Attending: FAMILY MEDICINE
Payer: COMMERCIAL

## 2024-01-08 DIAGNOSIS — R00.0 TACHYCARDIA: ICD-10-CM

## 2024-01-08 DIAGNOSIS — R42 INTERMITTENT LIGHTHEADEDNESS: ICD-10-CM

## 2024-01-08 PROCEDURE — 93246 EXT ECG>7D<15D RECORDING: CPT | Performed by: FAMILY MEDICINE

## 2024-01-08 PROCEDURE — 93247 EXT ECG>7D<15D SCAN A/R: CPT | Performed by: FAMILY MEDICINE

## 2024-01-08 NOTE — TELEPHONE ENCOUNTER
From: Mariza Cullen  To: Cheri Lyons  Sent: 1/6/2024 7:02 PM CST  Subject: Thyroid medicine     Hi Dr Lyons,    I saw your message about my thyroid being overactive and recommending I lower my dose. I plan to follow-up with Dr. Young but would appreciate your input on what you would recommend I lower my dosage to so I can discuss further with Dr. Young.    Also, I quick update: I've arranged to go in Monday to get the event recorder.    Kind regards,  Chidi

## 2024-01-08 NOTE — TELEPHONE ENCOUNTER
Please see question related to lab results below:      Component  Ref Range & Units 1/5/24  3:08 PM   TSH  0.550 - 4.780 mIU/mL 0.343 Low    Resulting Agency [facility] ([facility])

## 2024-01-18 ENCOUNTER — PATIENT MESSAGE (OUTPATIENT)
Dept: INTERNAL MEDICINE CLINIC | Facility: CLINIC | Age: 39
End: 2024-01-18

## 2024-02-06 ENCOUNTER — NURSE TRIAGE (OUTPATIENT)
Dept: INTERNAL MEDICINE CLINIC | Facility: CLINIC | Age: 39
End: 2024-02-06

## 2024-02-06 NOTE — TELEPHONE ENCOUNTER
Patient scheduled an appt via [employer] for the following concern:    Need medical leave for mental and physical health.

## 2024-02-07 NOTE — TELEPHONE ENCOUNTER
Action Requested: Summary for Provider     []  Critical Lab, Recommendations Needed  [] Need Additional Advice  []   FYI    []   Need Orders  [] Need Medications Sent to Pharmacy  []  Other     SUMMARY: Pt states her therapist recommended for her to take time off work. Pt states she had a panic attack at work and was sent home. Pt taking hydroxyzine. Does not want to take Xanax at this time, Dr. Genao aware. Pt needs documentation for the need for time off. Pt requesting virtual appointment as she cannot take time off yet. Appointment scheduled for tomorrow with Dr. Genao.    Reason for call: Anxiety (Months. Therapist recommends time off work)  Onset: Data Unavailable                       Future Appointments   Date Time Provider Department Center   2/8/2024 12:40 PM Lois Genao MD [facility]   2/12/2024  4:20 PM Lois Genao MD [facility]       Reason for Disposition   Symptoms interfere with work or school    Protocols used: Anxiety and Panic Attack-A-OH

## 2024-04-16 DIAGNOSIS — F41.9 ANXIETY: ICD-10-CM

## 2024-04-17 RX ORDER — HYDROXYZINE HYDROCHLORIDE 25 MG/1
25 TABLET, FILM COATED ORAL DAILY
Qty: 90 TABLET | Refills: 3 | Status: SHIPPED | OUTPATIENT
Start: 2024-04-17

## 2024-04-17 NOTE — TELEPHONE ENCOUNTER
Protocol Failed/ No Protocol    Requested Prescriptions   Pending Prescriptions Disp Refills    HYDROXYZINE 25 MG Oral Tab [Pharmacy Med Name: HYDROXYZINE HCL 25MG TABS (WHITE)] 90 tablet 0     Sig: TAKE 1 TABLET(25 MG) BY MOUTH DAILY       There is no refill protocol information for this order          Future Appointments         Provider Department Appt Notes    In 1 month Blessing Aponte DO [facility] [place] [facility] by PCP-Cognitive testing          Recent Outpatient Visits              2 months ago [facility] [place]    [facility]    [facility]    3 [facility]    Office Visit    5 months ago Physical exam, annual    [facility] [place] Lois Genao MD    Office Visit

## 2024-05-29 ENCOUNTER — OFFICE VISIT (OUTPATIENT)
Dept: NEUROLOGY | Facility: CLINIC | Age: 39
End: 2024-05-29

## 2024-05-29 VITALS — HEIGHT: 60.4 IN | WEIGHT: 138.31 LBS | BODY MASS INDEX: 26.8 KG/M2

## 2024-05-29 DIAGNOSIS — H53.2 DIPLOPIA: ICD-10-CM

## 2024-05-29 DIAGNOSIS — M47.12 CERVICAL SPONDYLOSIS WITH MYELOPATHY: ICD-10-CM

## 2024-05-29 DIAGNOSIS — R20.2 PARESTHESIAS: Primary | ICD-10-CM

## 2024-05-29 PROCEDURE — 3008F BODY MASS INDEX DOCD: CPT | Performed by: OTHER

## 2024-05-29 PROCEDURE — 99204 OFFICE O/P NEW MOD 45 MIN: CPT | Performed by: OTHER

## 2024-05-29 NOTE — PROGRESS NOTES
[facility] [address], [address]  [facility] [address]  [phone]              Date: May 29, 2024  Patient Name: Mariza Cullen   MRN: [id number]    Reason for Evaluation: Paresthesias and blurry vision     HPI:     Mariza Cullen is a 39 year old woman with past medical history of 1 generalized seizure at age 14, hypothyroidism, anxiety depression and polycystic ovarian disease who presents for evaluation of blurry vision, diplopia, ocular pain, paresthesias in her hands and feet.    She says 3-6 months ago she had 24 hours of diplopia, saw optometry after with reportedly normal exam.  Saw PCP after who recommended seeing neurology.      Since then, she has had issues with focusing her vision and concentrating.  She has had 1 ocular migraine \"rippling, wavy lines, shivering.\"      Paresthesias: >6 months of feet becoming numb when sitting for prolonged periods.  Also has numbness/tingling in her hands, less frequently.  Appears positional: demonstrates with arms extended and abducted.      Weakness: mild fine motor such as picking up cups sometimes.      She has had an MRI of her brain in the remote past for a generalized seizure.  LP done (showed \"nothing\"), never had another seizure.     Has had MRIs of her back and hip.  Had right sided numbness/tingling including face 4 years ago.  Went to functional medicine.  MRI C spine 2018 with spondylosis.      MRI brain/C spine 2021:   L C6-7 disc protrusion abut left ventral cord, L C7/8 nerve roots   Unremarkable brain     She is felt that her paresthesias in the past have been attributed to fibromyalgia.  She says her generalized seizure was attributed to monosodium glutamate use.    OUTPATIENT MEDICATIONS  Current Outpatient Medications on File Prior to Visit   Medication Sig Dispense Refill    hydrOXYzine 25 MG Oral Tab Take 1 tablet (25 mg total) by mouth daily. 90 tablet 3    levothyroxine 88 MCG Oral Tab Take 1  tablet (88 mcg total) by mouth. Levoxyl given by dr prince shore      Prenatal Vit-Fe Fumarate-FA (PRENATAL OR) Take by mouth.      NON FORMULARY Calm support      NON FORMULARY Mocucare      Lactobacillus (PROBIOTIC ACIDOPHILUS OR) Take by mouth. probioMed      NON FORMULARY Kristian trex      Omega-3 Fatty Acids (FISH OIL EXTRA STRENGTH OR) Take by mouth.       No current facility-administered medications on file prior to visit.       MEDICAL HISTORY  Past Medical History:    Anxiety    Depression    Hair abnormality    Polycystic disease, ovaries       SURGICAL HISTORY  Past Surgical History:   Procedure Laterality Date    Other surgical history      Osteoid osteoma righ femur  x 3 -most recent 12/2022,2017x 2, also dental       SOCIAL HISTORY  Social History     Socioeconomic History    Marital status:    Tobacco Use    Smoking status: Never    Smokeless tobacco: Never   Vaping Use    Vaping status: Never Used   Substance and Sexual Activity    Alcohol use: Never    Drug use: Never       FAMILY HISTORY  Family History   Problem Relation Age of Onset    Other (Other) Mother         stenosis of the cervix    Lipids Mother     Hypertension Father     Other (congential heart defect) Daughter         passed away at 7.5months       ALLERGIES  Allergies   Allergen Reactions    Dander HIVES    Dust HIVES    Garlic HIVES    Gluten Meal HIVES    Grass Coughing, OTHER (SEE COMMENTS), SWELLING and Runny nose    Orange HIVES and OTHER (SEE COMMENTS)    Other HIVES and UNKNOWN     MSG    Grass, trees, bushes    Corn NAUSEA AND VOMITING    Tomato NAUSEA AND VOMITING, MYALGIA and OTHER (SEE COMMENTS)     nausea    Monosodium Glutamate OTHER (SEE COMMENTS)     states she had a gran mal seizure r/t msg consumption    Peas UNKNOWN    Corn Oil DIARRHEA    Soy Allergy DIARRHEA       REVIEW OF SYSTEMS:   13-point review of systems was done and is negative unless otherwise stated in HPI.     PHYSICAL EXAM:   Ht 60.4\"   Wt 138  lb 4.8 oz (62.7 kg)   LMP 12/29/2023 (Exact Date)   BMI 26.65 kg/m²   General appearance: Well appearing, alert and in no acute distress  Skin: skin color normal.  No rashes or lesions.    Head: Normocephalic, atraumatic.    Neurological exam:    Mental Status:   Attention/Concentration: intact attention on bedside test   Fund of knowledge: intact  Speech: no dysarthria or aphasia     Cranial Nerves:  Pupils: OD 4 mm to 3 mm;  OS 4 mm to 3 mm   Visual Fields: R and L visual fields full to confrontation  CN III, CN IV, CN VI (Extraocular movements): intact.    CN V:   intact sensation to light touch in all three divisions of the trigeminal nerves bilaterally.  CN VII: normal facial muscle strength bilaterally  CN VIII: auditory acuity intact to bedside testing  CN IX/CN X: normal palate elevation  CN XI: normal movement of trapezius muscles bilaterally.  Motor Exam:   Muscle Bulk: No atrophy or fasciculations   Muscle Tone: normal tone in upper and lower extremities   Involuntary movements: none    Strength:       Deltoid  C5 Biceps  C5-6  Triceps  C7 Wrist extensors  C7-8 Wrist flexors  C7 Finger flexors  C6-8 Finger extensors   C8 Finger abductors  C8   Thumb abductors  T1    R 5 5 5 5 5 5 5 5 5   L 5 5 5 5 5 5 5 5 5        Hip flexion (Iliopsoas) L2-4  Hip extension   S1 Knee flexion  (Hamstrings)  L5-S1   Knee Extension (Quadriceps)  L3, L4  Ankle plantarflexion  S1 Ankle Dorsiflexion  Tib Anterior   L5   R 5 5 5 5 5 5   L 5 5 5 5 5 5     Sensory:   Light touch: intact in all 4 extremities but reduced in right upper extremity  Crossed deficits in RUE and LLE of PP and vibration with hyperesthesia     Reflexes:   3+ brisk throughout     Coordination:   Normal: Finger to nose: no ataxia or dysmetria     Gait:   Arises independently; normal posture; gait stable with normal stride length, rate, base and arm swing.     LABS/DATA:  Reviewed BMP, CBC, TSH      IMAGING:  N/A    ASSESSMENT:  The patient is a 39 year old  woman with past medical history of 1 generalized seizure at age 14, hypothyroidism, anxiety, depression and polycystic ovarian disease who presents for evaluation of blurry vision, diplopia, ocular pain, paresthesias in her hands and feet.   Her neurological examination is significant for hyperesthesia to pinprick and vibration sensation in her right upper extremity and left lower extremity right upper extremity hypoesthesia to light touch.    Paresthesias, diplopia chronic symptoms with extensive testing in the past, worrisome for functional neurological disorder, the other consideration is demyelinating disease  -MRI brain, orbits and C spine   -EMG/NCS all 4 extremities    Cervical spondylosis with myelopathy   -Physiatry referral     Discussed indication, administration, dose, and side effects with patient of any medications personally prescribed. Patient was advised to let my office know if they have any questions or concerns.       Today, I personally spent 30 minutes in this case, including chart review, time spent with patient doing face to face evaluation w/ interview and exam and patient education, counseling, and time was spent in patient education, counseling, and coordination of care as described above.   Issues discussed: Diagnosis and implications on future health, benefits and side effects of present and future medications, test results as well as further testing and medications required.    This note was prepared using Dragon Medical voice recognition dictation software and as a result, errors may occur. When identified, these errors have been corrected. While every attempt is made to correct errors during dictation, discrepancies may still exist    RAY Aponte DO   Staff Neurologist   5/29/2024  1:30 PM

## 2024-06-18 ENCOUNTER — PATIENT MESSAGE (OUTPATIENT)
Dept: ADMINISTRATIVE | Age: 39
End: 2024-06-18

## 2024-06-19 NOTE — TELEPHONE ENCOUNTER
Incoming message trasferred manuel Turner who stated pt is requesting clarification of valid dated .    Spoke to patient, provided referral valid dated for exams    [employer], the following request is authorized     Authorization #: [id number]  Valid: 06/18/2024-08/31/2024     [facility] notificaiton sent to mandi sent with auth info  
[employer], the following request is authorized     Authorization #: [id number]  Valid: 06/18/2024-08/31/2024     Patient notified via [facility]   
None

## 2024-06-21 ENCOUNTER — TELEPHONE (OUTPATIENT)
Dept: NEUROLOGY | Facility: CLINIC | Age: 39
End: 2024-06-21

## 2024-06-21 NOTE — TELEPHONE ENCOUNTER
RN called back to Yissel. Advised Yissel that we do not handle the authorization for our imaging. Pt is going in on Sunday to [facility] for her imaging. RN advised that the message will be sent to prior authorization team for further advise.

## 2024-06-21 NOTE — TELEPHONE ENCOUNTER
true from [facility] insurance verification called in advised that pt is going to be getting mri sprine cervical with and without contrast. Advised a prior authorization for the mri was sent over to us on 6/17 and sent it over again today once off the phone. Did inform True we don't handle any prior authorization for mri here in the office and attempted to guide her to central scheduling for further assistance. rTue is asking for clinical team to give her a call. Pls advise.

## 2024-06-24 ENCOUNTER — NURSE TRIAGE (OUTPATIENT)
Dept: INTERNAL MEDICINE CLINIC | Facility: CLINIC | Age: 39
End: 2024-06-24

## 2024-06-24 NOTE — TELEPHONE ENCOUNTER
[facility]:    Symptoms of diarrhea, bloating, gas, intermittent low grade fever, sweating, nausea, & fatigue.

## 2024-06-24 NOTE — TELEPHONE ENCOUNTER
Action Requested: Summary for Provider     []  Critical Lab, Recommendations Needed  [] Need Additional Advice  []   FYI    []   Need Orders  [] Need Medications Sent to Pharmacy  []  Other     SUMMARY: patient self scheduled using my chart for a video visit for abdominal pain, bloating, nausea, low grade fever. Upon speaking with her advised a video visit is not appropriate for these symptoms. She advised her symptoms are resolving and she has no more diarrhea, nausea or stomach discomfort. Had a formed bowel movement this morning and no more fevers or stomach pain. She asked to reschedule in person \"in case she still feels she needs to come in.\" Rescheduled with Birgit CASILLAS for 7/1/24 at 2:30 and advised ok to cancel if she's feeling better. Patient agreed to this     Reason for call: Abdominal Pain and Nausea  Onset: Data Unavailable                   Reason for Disposition   MILD pain (e.g., does not interfere with normal activities) and pain comes and goes (cramps) lasts > 48 hours  (Exception: This same abdominal pain is a chronic symptom recurrent or ongoing AND present > 4 weeks.)    Protocols used: Abdominal Pain - Female-A-OH

## 2024-07-01 ENCOUNTER — OFFICE VISIT (OUTPATIENT)
Dept: INTERNAL MEDICINE CLINIC | Facility: CLINIC | Age: 39
End: 2024-07-01
Payer: COMMERCIAL

## 2024-07-01 VITALS
BODY MASS INDEX: 26.55 KG/M2 | HEIGHT: 60.4 IN | WEIGHT: 137 LBS | DIASTOLIC BLOOD PRESSURE: 71 MMHG | SYSTOLIC BLOOD PRESSURE: 107 MMHG | TEMPERATURE: 97 F | HEART RATE: 83 BPM | OXYGEN SATURATION: 98 %

## 2024-07-01 DIAGNOSIS — R19.7 DIARRHEA, UNSPECIFIED TYPE: ICD-10-CM

## 2024-07-01 DIAGNOSIS — R11.0 NAUSEA: ICD-10-CM

## 2024-07-01 DIAGNOSIS — R14.0 ABDOMINAL BLOATING: Primary | ICD-10-CM

## 2024-07-01 RX ORDER — ONDANSETRON 4 MG/1
4 TABLET, ORALLY DISINTEGRATING ORAL EVERY 8 HOURS PRN
Qty: 21 TABLET | Refills: 0 | Status: SHIPPED | OUTPATIENT
Start: 2024-07-01

## 2024-07-01 RX ORDER — GABAPENTIN 100 MG/1
200 CAPSULE ORAL DAILY
COMMUNITY
Start: 2024-06-27

## 2024-07-01 RX ORDER — HYDROXYZINE HYDROCHLORIDE 10 MG/1
20 TABLET, FILM COATED ORAL DAILY
COMMUNITY
Start: 2024-06-26

## 2024-07-01 NOTE — PROGRESS NOTES
Subjective:   Mariza Cullen is a 39 year old female who presents for Nausea, Diarrhea, and Bloating     Symptoms started a week ago Friday when she was not feeling well, then Saturday had a \"low-grade fever\", nausea, diarrhea and loss of appetite   Stool has been yellow, feels bloated and runs to the bathroom to have diarrhea about 3-4 episodes a day of loose stools  During the week she started Taking SBI Protect which is an IgG supplement for microbiome health. While on this her symptoms improved and she was having only 1-2 episodes of diarrhea a week, then cut down on the supplement and symptoms worsened again   Also having hot flashes and sweating     Denies recent travel outside the country or recent antibiotic use, no sick contacts, ate a gluten-free cupcake but otherwise no changes in diet   She is sensitive to dairy and nightshades     Currently able to take solid foods but becomes nauseous and has acid reflux   No known underlying GI disorders   Has dysautonomia, for which a functional medicine doctor is treating her with low dose naltrexone. This has been on a ramp up from 0.5mg to 4mg and she is currently on the 3mg dose  We did go over the side effects of naltrexone which include all of her symptoms, though of note this is noted for standard dose naltrexone   Her functional medicine provider is Dr. Ewelina Young     History/Other:    Chief Complaint Reviewed and Verified  No Further Nursing Notes to   Review  Tobacco Reviewed  Allergies Reviewed  Medications Reviewed  OB   Status Reviewed         Tobacco:  She has never smoked tobacco.    Current Outpatient Medications   Medication Sig Dispense Refill    hydrOXYzine 10 MG Oral Tab Take 2 tablets (20 mg total) by mouth daily.      gabapentin 100 MG Oral Cap Take 2 capsules (200 mg total) by mouth daily.      NALTREXONE HCL OR Take 3 mg by mouth daily.      Simethicone 80 MG Oral Tab Take 80 mg by mouth 3 (three) times daily as needed  (bloating and gas pains). 120 tablet 0    ondansetron 4 MG Oral Tablet Dispersible Take 1 tablet (4 mg total) by mouth every 8 (eight) hours as needed for Nausea. 21 tablet 0    levothyroxine 88 MCG Oral Tab Take 1 tablet (88 mcg total) by mouth. Levoxyl given by dr prince shore      NON FORMULARY Calm support      NON FORMULARY Mocucare      Lactobacillus (PROBIOTIC ACIDOPHILUS OR) Take by mouth. probioMed      NON FORMULARY Kristian trex      Omega-3 Fatty Acids (FISH OIL EXTRA STRENGTH OR) Take by mouth.      hydrOXYzine 25 MG Oral Tab Take 1 tablet (25 mg total) by mouth daily. (Patient not taking: Reported on 7/1/2024) 90 tablet 3    Prenatal Vit-Fe Fumarate-FA (PRENATAL OR) Take by mouth.       Review of Systems:  Review of Systems  10 point review of systems otherwise negative with the exception of HPI and assessment and plan    Objective:   /71   Pulse 83   Temp 97.4 °F (36.3 °C)   Ht 5' 0.4\" (1.534 m)   Wt 137 lb (62.1 kg)   LMP 06/11/2024 (Exact Date)   SpO2 98%   BMI 26.40 kg/m²  Estimated body mass index is 26.4 kg/m² as calculated from the following:    Height as of this encounter: 5' 0.4\" (1.534 m).    Weight as of this encounter: 137 lb (62.1 kg).  Physical Exam  Vitals reviewed.   Constitutional:       General: She is not in acute distress.     Appearance: Normal appearance. She is well-developed.   Cardiovascular:      Rate and Rhythm: Normal rate and regular rhythm.      Heart sounds: Normal heart sounds.   Pulmonary:      Effort: Pulmonary effort is normal.      Breath sounds: Normal breath sounds.   Abdominal:      General: Bowel sounds are normal. There is distension (soft).      Palpations: Abdomen is soft.      Tenderness: There is abdominal tenderness in the right lower quadrant and epigastric area.   Skin:     General: Skin is warm and dry.   Neurological:      Mental Status: She is alert and oriented to person, place, and time.     Assessment & Plan:   1. Abdominal bloating  (Primary)  -     OVA AND PARASITES [681] [Q]  -     H. Pylori Stool Ag, EIA  -     Stool Culture  -     CBC With Differential With Platelet; Future; Expected date: 07/01/2024  -     Comp Metabolic Panel (14)  -     Simethicone; Take 80 mg by mouth 3 (three) times daily as needed (bloating and gas pains).  Dispense: 120 tablet; Refill: 0  -     CBC With Differential With Platelet  2. Diarrhea, unspecified type  -     OVA AND PARASITES [681] [Q]  -     H. Pylori Stool Ag, EIA  -     Stool Culture  -     CBC With Differential With Platelet; Future; Expected date: 07/01/2024  -     Comp Metabolic Panel (14)  -     CBC With Differential With Platelet  3. Nausea  -     OVA AND PARASITES [681] [Q]  -     H. Pylori Stool Ag, EIA  -     Stool Culture  -     CBC With Differential With Platelet; Future; Expected date: 07/01/2024  -     Comp Metabolic Panel (14)  -     Ondansetron; Take 1 tablet (4 mg total) by mouth every 8 (eight) hours as needed for Nausea.  Dispense: 21 tablet; Refill: 0  -     CBC With Differential With Platelet      SONJA Beatty, 7/1/2024, 2:47 PM

## 2024-07-02 ENCOUNTER — OFFICE VISIT (OUTPATIENT)
Dept: PHYSICAL MEDICINE AND REHAB | Facility: CLINIC | Age: 39
End: 2024-07-02
Payer: COMMERCIAL

## 2024-07-02 VITALS — BODY MASS INDEX: 26.2 KG/M2 | RESPIRATION RATE: 18 BRPM | WEIGHT: 137 LBS | HEIGHT: 60.5 IN

## 2024-07-02 DIAGNOSIS — M54.2 NECK PAIN: Primary | ICD-10-CM

## 2024-07-02 PROCEDURE — 3008F BODY MASS INDEX DOCD: CPT | Performed by: PHYSICAL MEDICINE & REHABILITATION

## 2024-07-02 PROCEDURE — 99204 OFFICE O/P NEW MOD 45 MIN: CPT | Performed by: PHYSICAL MEDICINE & REHABILITATION

## 2024-07-02 NOTE — PROGRESS NOTES
NEW PATIENT VISIT    CHIEF COMPLAINT  Neck Pain    HISTORY OF PRESENTING ILLNESS  Mariza Cullen is a 39 year old female who presents for evaluation of neck pain.  Patient is referred to my office by Dr. Aponte complaining of axial neck pain.  She had MRIs of the brain neck and soft tissues performed which were reviewed in full.  Patient states that she has had a longstanding history of neck pain worsening over the last 8 months.  Pain is primarily located the base of the neck with radiation of the bilateral shoulders with numbness and tingling to the bilateral hands.  He states her symptoms vary with intensity depending on the activities or positions.  Her current level of pain is rated 4/10 but can reach 8/10.    She continues with gabapentin as well as Advil.  Currently getting acupuncture with some mild relief.  Has not participated in physical therapy previously.  She has an EMG ordered and scheduled with neurology.      Neck apin over the last 6-8 months, she has pain in the fingers on boths sides. She states all fingers involved. She feels this mostly while laying dwon at night. Present over the last 8 months.       Medications: Gabapentin, naltrexone    Prior anxiety, depression, PTSD.       PAST MEDICAL HISTORY  Past Medical History:    Anxiety    Depression    Hair abnormality    Polycystic disease, ovaries       PAST SURGICAL HISTORY  Past Surgical History:   Procedure Laterality Date    Other surgical history      Osteoid osteoma righ femur  x 3 -most recent 12/2022,2017x 2, also dental       MEDICATIONS  Current Outpatient Medications on File Prior to Visit   Medication Sig Dispense Refill    hydrOXYzine 10 MG Oral Tab Take 2 tablets (20 mg total) by mouth daily.      gabapentin 100 MG Oral Cap Take 2 capsules (200 mg total) by mouth daily.      NALTREXONE HCL OR Take 3 mg by mouth daily.      Simethicone 80 MG Oral Tab Take 80 mg by mouth 3 (three) times daily as needed (bloating and gas pains). 120  tablet 0    ondansetron 4 MG Oral Tablet Dispersible Take 1 tablet (4 mg total) by mouth every 8 (eight) hours as needed for Nausea. 21 tablet 0    levothyroxine 88 MCG Oral Tab Take 1 tablet (88 mcg total) by mouth. Levoxyl given by dr prince shore      NON FORMULARY Calm support      NON FORMULARY Mocucare      Lactobacillus (PROBIOTIC ACIDOPHILUS OR) Take by mouth. probioMed      NON FORMULARY Kristian trex      Omega-3 Fatty Acids (FISH OIL EXTRA STRENGTH OR) Take by mouth.       No current facility-administered medications on file prior to visit.       ALLERGIES  Allergies   Allergen Reactions    Dander HIVES    Dust HIVES    Garlic HIVES    Gluten Meal HIVES    Grass Coughing, OTHER (SEE COMMENTS), SWELLING and Runny nose    Orange HIVES and OTHER (SEE COMMENTS)    Other HIVES and UNKNOWN     MSG    Grass, trees, bushes    Corn NAUSEA AND VOMITING    Tomato NAUSEA AND VOMITING, MYALGIA and OTHER (SEE COMMENTS)     nausea    Monosodium Glutamate OTHER (SEE COMMENTS)     states she had a gran mal seizure r/t msg consumption    Peas UNKNOWN    Corn Oil DIARRHEA    Soy Allergy DIARRHEA       SOCIAL HISTORY   reports that she has never smoked. She has never used smokeless tobacco. She reports that she does not drink alcohol and does not use drugs.    FAMILY HISTORY  Family History   Problem Relation Age of Onset    Other (Other) Mother         stenosis of the cervix    Lipids Mother     Hypertension Father     Other (congential heart defect) Daughter         passed away at 7.5months       REVIEW OF SYSTEMS  Complete review of systems was performed and was negative except for those items stated in the History of Presenting Illness and Past Medical/Surgical History.    PHYSICAL EXAMINATION  GENERAL:  In no acute distress. Well-developed and well nourished.   SKIN: No rashes or open wounds involving the upper extremities and neck.  NEUROLOGIC:   Strength: 5/5 throughout bilateral upper and lower extremities in all  major muscle groups.   Sensation: intact light touch sensation throughout bilateral upper and lower extremities.   Reflexes: intact and symmetric in bilateral upper and lower extremities. Ac’s negative. Babinski downgoing bilaterally. No clonus.   Gait: able to heel walk, toe walk, and perform tandem gait.   MUSCULOSKELETAL:  Inspection: shoulders in protracted positions, lower cervical flexion, upper cervical extension posture. No scapular winging or muscular atrophy.  Palpation: tenderness was present over cervical paraspinal musculature throughout the mid to low cervical spine with extension to the trapezius bilaterally.  Good range of motion of the bilateral shoulders and neck with exacerbation of neck pain primarily.      REVIEW OF PRIOR X-RAYS/STUDIES  Independently reviewed the MRI of the cervical spine dated 1/23/2024 which reveals some midline disc bulging at C5-6 and C6-7, there is a paracentral left disc protrusion at C6-7 which results in mild left greater than right neuroforaminal/subarticular recess narrowing at C5-6 there is no significant central protrusion but very mild neuroforaminal narrowing on the left greater than right sides.  No significant facet synovitis on the right or the left upper cervical spine    IMPRESSION/DIAGNOSIS  Encounter Diagnosis   Name Primary?    Neck pain Yes       TREATMENT/PLAN  I would like her to get started in physical therapy working on cervical spine stabilization, postural retraining as well as development of a home exercise program.    She will remain on her previously prescribed medications.    She will follow-up with me after EMG is completed at which time we may have a more clear direction in terms of nerve compromise either coming from the cervical spine or a peripheral source in which case we may consider intervention at that time.    Education was provided regarding the above impression/diagnosis and treatment options/plan were discussed.  All questions  were answered during today's visit.  Patient will contact clinic if any other questions or concerns.            Lyndon Borjas DO  Interventional Spine and Sports Medicine Specialist   Physical Medicine and Rehabilitation  [facility]  [address] [place] [id number]    [facility]  [address] [place] [address]

## 2024-07-03 LAB
ABSOLUTE BASOPHILS: 58 CELLS/UL (ref 0–200)
ABSOLUTE EOSINOPHILS: 22 CELLS/UL (ref 15–500)
ABSOLUTE LYMPHOCYTES: 1606 CELLS/UL (ref 850–3900)
ABSOLUTE MONOCYTES: 518 CELLS/UL (ref 200–950)
ABSOLUTE NEUTROPHILS: 4997 CELLS/UL (ref 1500–7800)
ALBUMIN/GLOBULIN RATIO: 1.8 (CALC) (ref 1–2.5)
ALBUMIN: 4.9 G/DL (ref 3.6–5.1)
ALKALINE PHOSPHATASE: 77 U/L (ref 31–125)
ALT: 18 U/L (ref 6–29)
AST: 19 U/L (ref 10–30)
BASOPHILS: 0.8 %
BILIRUBIN, TOTAL: 1.2 MG/DL (ref 0.2–1.2)
BUN: 13 MG/DL (ref 7–25)
CALCIUM: 10 MG/DL (ref 8.6–10.2)
CARBON DIOXIDE: 27 MMOL/L (ref 20–32)
CHLORIDE: 102 MMOL/L (ref 98–110)
CREATININE: 0.82 MG/DL (ref 0.5–0.97)
EGFR: 94 ML/MIN/1.73M2
EOSINOPHILS: 0.3 %
GLOBULIN: 2.8 G/DL (CALC) (ref 1.9–3.7)
GLUCOSE: 85 MG/DL (ref 65–99)
HEMATOCRIT: 41.6 % (ref 35–45)
HEMOGLOBIN: 13.6 G/DL (ref 11.7–15.5)
LYMPHOCYTES: 22.3 %
MCH: 30.3 PG (ref 27–33)
MCHC: 32.7 G/DL (ref 32–36)
MCV: 92.7 FL (ref 80–100)
MONOCYTES: 7.2 %
MPV: 11.1 FL (ref 7.5–12.5)
NEUTROPHILS: 69.4 %
PLATELET COUNT: 230 THOUSAND/UL (ref 140–400)
POTASSIUM: 4.2 MMOL/L (ref 3.5–5.3)
PROTEIN, TOTAL: 7.7 G/DL (ref 6.1–8.1)
RDW: 12.2 % (ref 11–15)
RED BLOOD CELL COUNT: 4.49 MILLION/UL (ref 3.8–5.1)
SODIUM: 138 MMOL/L (ref 135–146)
WHITE BLOOD CELL COUNT: 7.2 THOUSAND/UL (ref 3.8–10.8)

## 2024-07-12 ENCOUNTER — PATIENT MESSAGE (OUTPATIENT)
Dept: PHYSICAL MEDICINE AND REHAB | Facility: CLINIC | Age: 39
End: 2024-07-12

## 2024-09-19 ENCOUNTER — MED REC SCAN ONLY (OUTPATIENT)
Dept: PHYSICAL MEDICINE AND REHAB | Facility: CLINIC | Age: 39
End: 2024-09-19

## 2024-10-14 ENCOUNTER — PROCEDURE VISIT (OUTPATIENT)
Dept: NEUROLOGY | Facility: CLINIC | Age: 39
End: 2024-10-14
Payer: COMMERCIAL

## 2024-10-14 NOTE — PROCEDURES
HYSICAL:    Cranial nerves grossly normal. Motor examination showed strength to be 5 of 5 throughout.     HISTORY:    Mariza Cullen is a 39 year old female with a complaint of intermittent paresthesias of all 4 extremities, times over the same time, sometimes one of the time.  Also history of significant head and neck trauma some many years ago, some fasciculations in the left upper extremity as mentioned.    TECHNICAL SUMMARY:    There were no significant technical difficulty encountered during this study.  Nerve conduction studies were performed without warming with skin temperature between 32-33 degrees centigrade.    SUMMARY:    The motor conduction test was normal in all 6 of the tested nerves: R Median - APB, L Median - APB, R Ulnar - ADM, L Ulnar - ADM, R Peroneal - EDB, R Tibial - AH.    The sensory conduction test was normal in all 8 of the tested nerves: R Median - Dig II (Antidromic), L Median - Dig II (Antidromic), R Ulnar - Dig V (Antidromic), L Ulnar - Dig V (Antidromic), R Sural - (Antidromic), L Sural - (Antidromic), R Superficial peroneal - (Antidromic), L Superficial peroneal - (Antidromic).    The needle EMG examination was performed in 24 muscles. It was normal in 22 muscle(s): L. Deltoid, R. Deltoid, L. Biceps brachii, R. Biceps brachii, L. Extensor digitorum communis, R. Extensor digitorum communis, L. Pronator teres, R. Pronator teres, L. First dorsal interosseous, R. First dorsal interosseous, L. Vastus medialis, R. Vastus medialis, L. Vastus lateralis, R. Vastus lateralis, L. Biceps femoris (short head), R. Biceps femoris (short head), L. Tibialis anterior, R. Tibialis anterior, L. Gastrocnemius (Medial head), R. Gastrocnemius (Medial head), L. Dorsal interossei (pedis), R. Dorsal interossei (pedis). The study was abnormal in 2 muscle(s), with the following distribution:  Abnormal spontaneous/insertional activity was found in L. Triceps brachii.  The MUP waveform abnormality was found  in L. Triceps brachii, R. Triceps brachii.      Sensory Nerve Conduction Study       Nerve / Sites Peak Lat NP Amp PP Amp Segments Distance    ms µV µV  cm   R Median - Dig II (Antidromic)      Wrist 3.1 60.3 51.5 Wrist - Index 14   L Median - Dig II (Antidromic)      Wrist 3.4 104.1 183.4 Wrist - Index 14   R Ulnar - Dig V (Antidromic)      Wrist 3.4 45.5 91.9 Wrist - Dig V 14   L Ulnar - Dig V (Antidromic)      Wrist 3.4 40.9 96.6 Wrist - Dig V 14   R Sural - (Antidromic)      Calf 2.9 81.8 92.1 Calf - Ankle 14   L Sural - (Antidromic)      Calf 3.0 67.0 85.8 Calf - Ankle 14   R Superficial peroneal - (Antidromic)      Lat leg 3.0 8.1 8.2 Lat leg - Ankle 14   L Superficial peroneal - (Antidromic)      Lat leg 2.6 8.1 13.1 Lat leg - Ankle 14       Motor Nerve Conduction Study       Nerve / Sites Muscle Latency Amplitude Rel Amp Segments Distance Lat Diff Velocity     ms mV %  cm ms m/s   R Median - APB      Wrist APB 3.3 16.4  Wrist - APB 8        Elbow APB 6.4 14.2 87 Elbow - Wrist 22 3.10 71   L Median - APB      Wrist APB 3.5 13.7  Wrist - APB 8        Elbow APB 6.9 13.4 97.5 Elbow - Wrist 20 3.42 59   R Ulnar - ADM      Wrist ADM 2.8 7.9 100 Wrist - ADM 8        B.Elbow ADM 5.1 8.0 101 B.Elbow - Wrist 15 2.29 65      A.Elbow ADM 6.4 8.4 105 A.Elbow - B.Elbow 9 1.38 65   L Ulnar - ADM      Wrist ADM 2.8 10.4 100 Wrist - ADM 8        B.Elbow ADM 5.1 10.4 100 B.Elbow - Wrist 16 2.27 70      A.Elbow ADM 6.7 10.0 95.8 A.Elbow - B.Elbow 9 1.56 58   R Peroneal - EDB      Ankle EDB 4.2 4.9 100 Ankle - EDB 8        B. Fib Head EDB 10.1 5.1 103 B. Fib Head - Ankle 26 5.92 44      A. Fib Head EDB 11.4 5.1 100 A. Fib Head - B. Fib Head 10 1.23 81   R Tibial - AH      Ankle AH 5.2 16.9 100 Ankle - AH 8        Knee AH 11.8 8.1 48 Knee - Ankle 37 6.58 56       EMG Summary Table     Spontaneous MUAP Recruitment   Muscle IA Fib PSW Fasc Comments Amp Dur. PPP Pattern   L. Deltoid N None None None None N N N N   R. Deltoid N None  None None None N N N N   L. Biceps brachii N None None None None N N N N   R. Biceps brachii N None None None None N N N N   L. Triceps brachii N None None None CRDs 1+ N N N   R. Triceps brachii N None None None None 1+ N N N   L. Extensor digitorum communis N None None None None N N N N   R. Extensor digitorum communis N None None None None N N N N   L. Pronator teres N None None None None N N N N   R. Pronator teres N None None None None N N N N   L. First dorsal interosseous N None None None None N N N N   R. First dorsal interosseous N None None None None N N N N   L. Vastus medialis N None None None None N N N N   R. Vastus medialis N None None None None N N N N   L. Vastus lateralis N None None None None N N N N   R. Vastus lateralis N None None None None N N N N   L. Biceps femoris (short head) N None None None None N N N N   R. Biceps femoris (short head) N None None None None N N N N   L. Tibialis anterior N None None None None N N N N   R. Tibialis anterior N None None None None N N N N   L. Gastrocnemius (Medial head) N None None None None N N N N   R. Gastrocnemius (Medial head) N None None None None N N N N   L. Dorsal interossei (pedis) N None None None None N N N N   R. Dorsal interossei (pedis) N None None None None N N N N                                       CONCLUSION:    There is no electrodiagnostic evidence of any mononeuropathy, plexopathy, peripheral polyneuropathy, inflammatory/necrotizing myopathy or radiculopathy affecting r either of upper or either of lower extremities extremity.  Except findings of incomplete reinnervation in the left triceps muscle    CLINICAL CORRELATION:    These are essentially normal findings do not adequately account for the patient's clinical complaints on peripheral nerve or muscle basis.  Evidence of some mild incomplete reinnervation in the left triceps and slight abnormalities in the right triceps muscles, most likely related to the prior history of neck  trauma.    Ori Riley MD

## 2024-10-23 ENCOUNTER — TELEMEDICINE (OUTPATIENT)
Dept: NEUROLOGY | Facility: CLINIC | Age: 39
End: 2024-10-23
Payer: COMMERCIAL

## 2024-10-23 DIAGNOSIS — H53.2 DIPLOPIA: ICD-10-CM

## 2024-10-23 DIAGNOSIS — M47.812 CERVICAL SPONDYLOSIS: ICD-10-CM

## 2024-10-23 DIAGNOSIS — R20.2 PARESTHESIAS: Primary | ICD-10-CM

## 2024-10-23 PROCEDURE — 99212 OFFICE O/P EST SF 10 MIN: CPT | Performed by: OTHER

## 2024-10-23 NOTE — PROGRESS NOTES
[address], [address]  [facility] [address]  [phone]          Established  Follow Up Visit   DISTANCE HEALTH VISIT          Telehealth using [employer] Verbal Consent   I conducted a telehealth visit with Mariza Cullen today, 10/23/2024, which was completed using two-way, real-time interactive audio and video communication. This has been done in good mercedes to provide continuity of care in the best interest of the provider-patient relationship, due to the COVID -19 public health crisis/national emergency where restrictions of face-to-face office visits are ongoing. Every conscious effort was taken to allow for sufficient and adequate time to complete the visit.  The patient was made aware of the limitations of the telehealth visit, including treatment limitations as no physical exam could be performed.  The patient was advised to call 911 or to go to the ER in case there was an emergency.  The patient was also advised of the potential privacy & security concerns related to the telehealth platform.   The patient was made aware of where to find [facility]'s notice of privacy practices, telehealth consent form and other related consent forms and documents.  which are located on the [facility] website. The patient verbally agreed to telehealth consent form, related consents and the risks discussed.    Lastly, the patient confirmed that they were in [place].   Included in this visit, time may have been spent reviewing labs, medications, radiology tests and decision making. Appropriate medical decision-making and tests are ordered as detailed in the plan of care above.  Coding/billing information is submitted for this visit based on complexity of care and/or time spent for the visit.  12 min spent with the patient on the phone      Date: October 23, 2024  Patient Name: Mariza Cullen   MRN: [id number]  Primary physician: Nick Leone  [place] [address]    Interval  History:   --She has intermittent numbness/tingling, less intense and frequent.  Saw physiatry.  Started PT with cranio-sacral therapy, improving her slowly.  Her functional medicine physician is going to look into adjusting her thyroid medication.  She is on Gabapentin for anxiety.  She is on treatment for dysautonomia for intermittent episodes of diaphoresis on Naltrexone.       OUTPATIENT MEDICATIONS  Medications Ordered Prior to Encounter[1]      PHYSICAL EXAM:   [facility] 06/11/2024 (Exact Date)   General appearance: Well appearing, and in no acute distress  Skin: skin color, texture normal.  No rashes or lesions.    Head: Normocephalic, atraumatic.    Neurological exam:    Mental Status:   Attention/Concentration: intact attention on bedside test   Fund of knowledge: intact  Speech: no dysarthria or aphasia     LABS/DATA:  CBC and CMP reviewed       IMAGING:  MRI reports reviewed    ASSESSMENT:  The patient is a 39 year old  woman with past medical history of 1 generalized seizure at age 14, hypothyroidism, anxiety, depression and polycystic ovarian disease who presents for follow up.    She was initially seen for evaluation of blurry vision, diplopia, ocular pain, paresthesias in her hands and feet.     Her neurological examination was significant for hyperesthesia to pinprick and vibration sensation in her right upper extremity and left lower extremity right upper extremity hypoesthesia to light touch.    -EMG and nerve conduction study 10/14/2024 normal other than incomplete reinnervation in bilateral triceps, probably related to neck trauma history  -MRI brain/orbits: Normal  -MRI cervical spine with and without: Stable spondylosis/degenerative disc disease, left foraminal narrowing at C5-6, mild to moderate stenosis and left foraminal narrowing at C6-7.  Normal cord.       Cervical spondylosis with paresthesias  improving, no abnormalities on EMG nerve conduction study, normal MRI brain and orbits, MRI cervical  spine with low burden of spondylotic disease.  Likely related to myofascial involvement; discussed lack of nerve damage or significant arthritic abnormalities on testing - reassuring findings   Dysautonomia is being treated with Naltrexone by another specialist   -Continue PT   -Continue Gabapentin (she is on it for anxiety) at low dose     Diplopia/blurry vision improved   -We discussed neuro-ophthalmology referral and she will let me know if interested.  Physician names were provided.      Follow up: 6-8 months     Discussed indication, administration, dose, and side effects with patient of any medications personally prescribed. Patient was advised to let my office know if they have any questions or concerns.     Today, I personally spent 15 minutes in this case, including chart review, time spent with patient doing face to face evaluation w/ interview and exam and patient education, counseling, and time was spent in patient education, counseling, and coordination of care as described above.   Issues discussed: Diagnosis and implications on future health, benefits and side effects of present and future medications, test results as well as further testing and medications required.    This note was prepared using Dragon [employer] voice recognition dictation software and as a result, errors may occur. When identified, these errors have been corrected. While every attempt is made to correct errors during dictation, discrepancies may still exist    RAY Aponte DO   Staff Physician, Neurology   10/23/2024  1:16 PM               [1]   Current Outpatient Medications on File Prior to Visit   Medication Sig Dispense Refill    hydrOXYzine 10 MG Oral Tab Take 2 tablets (20 mg total) by mouth daily.      gabapentin 100 MG Oral Cap Take 2 capsules (200 mg total) by mouth daily.      NALTREXONE HCL OR Take 3 mg by mouth daily.      ondansetron 4 MG Oral Tablet Dispersible Take 1 tablet (4 mg total) by mouth every 8 (eight) hours as  needed for Nausea. 21 tablet 0    levothyroxine 88 MCG Oral Tab Take 1 tablet (88 mcg total) by mouth. Levoxyl given by dr prince shore      NON FORMULARY Calm support      NON FORMULARY Mocucare      Lactobacillus (PROBIOTIC ACIDOPHILUS OR) Take by mouth. probioMed      NON FORMULARY Kristian trex      Omega-3 Fatty Acids (FISH OIL EXTRA STRENGTH OR) Take by mouth.       No current facility-administered medications on file prior to visit.

## 2024-11-11 ENCOUNTER — MED REC SCAN ONLY (OUTPATIENT)
Dept: PHYSICAL MEDICINE AND REHAB | Facility: CLINIC | Age: 39
End: 2024-11-11

## 2024-11-20 ENCOUNTER — MED REC SCAN ONLY (OUTPATIENT)
Dept: INTERNAL MEDICINE CLINIC | Facility: CLINIC | Age: 39
End: 2024-11-20

## 2024-11-20 ENCOUNTER — TELEPHONE (OUTPATIENT)
Dept: INTERNAL MEDICINE CLINIC | Facility: CLINIC | Age: 39
End: 2024-11-20

## 2024-11-20 NOTE — TELEPHONE ENCOUNTER
Patient is calling to advise provider she will be faxing lab results to doctors office today.

## 2025-01-03 ENCOUNTER — MED REC SCAN ONLY (OUTPATIENT)
Dept: PHYSICAL MEDICINE AND REHAB | Facility: CLINIC | Age: 40
End: 2025-01-03

## 2025-01-11 ENCOUNTER — LAB ENCOUNTER (OUTPATIENT)
Dept: LAB | Age: 40
End: 2025-01-11
Attending: INTERNAL MEDICINE
Payer: COMMERCIAL

## 2025-01-11 ENCOUNTER — OFFICE VISIT (OUTPATIENT)
Dept: INTERNAL MEDICINE CLINIC | Facility: CLINIC | Age: 40
End: 2025-01-11
Payer: COMMERCIAL

## 2025-01-11 VITALS
WEIGHT: 129 LBS | HEART RATE: 67 BPM | DIASTOLIC BLOOD PRESSURE: 76 MMHG | SYSTOLIC BLOOD PRESSURE: 113 MMHG | HEIGHT: 60.5 IN | BODY MASS INDEX: 24.67 KG/M2

## 2025-01-11 DIAGNOSIS — G90.1 DYSAUTONOMIA (HCC): ICD-10-CM

## 2025-01-11 DIAGNOSIS — M47.812 CERVICAL SPONDYLOSIS: ICD-10-CM

## 2025-01-11 DIAGNOSIS — Z00.00 PHYSICAL EXAM, ANNUAL: Primary | ICD-10-CM

## 2025-01-11 DIAGNOSIS — E55.9 VITAMIN D DEFICIENCY: ICD-10-CM

## 2025-01-11 DIAGNOSIS — M25.50 CHRONIC PAIN OF MULTIPLE JOINTS: ICD-10-CM

## 2025-01-11 DIAGNOSIS — G89.29 CHRONIC PAIN OF MULTIPLE JOINTS: ICD-10-CM

## 2025-01-11 DIAGNOSIS — Z00.00 PHYSICAL EXAM, ANNUAL: ICD-10-CM

## 2025-01-11 LAB
ALBUMIN SERPL-MCNC: 5.3 G/DL (ref 3.2–4.8)
ALBUMIN/GLOB SERPL: 1.8 {RATIO} (ref 1–2)
ALP LIVER SERPL-CCNC: 87 U/L
ALT SERPL-CCNC: 17 U/L
ANION GAP SERPL CALC-SCNC: 10 MMOL/L (ref 0–18)
AST SERPL-CCNC: 21 U/L (ref ?–34)
BILIRUB SERPL-MCNC: 0.9 MG/DL (ref 0.3–1.2)
BUN BLD-MCNC: 14 MG/DL (ref 9–23)
BUN/CREAT SERPL: 16.1 (ref 10–20)
CALCIUM BLD-MCNC: 10.5 MG/DL (ref 8.7–10.4)
CHLORIDE SERPL-SCNC: 103 MMOL/L (ref 98–112)
CHOLEST SERPL-MCNC: 250 MG/DL (ref ?–200)
CO2 SERPL-SCNC: 28 MMOL/L (ref 21–32)
CREAT BLD-MCNC: 0.87 MG/DL
DEPRECATED RDW RBC AUTO: 38.9 FL (ref 35.1–46.3)
EGFRCR SERPLBLD CKD-EPI 2021: 87 ML/MIN/1.73M2 (ref 60–?)
ERYTHROCYTE [DISTWIDTH] IN BLOOD BY AUTOMATED COUNT: 11.9 % (ref 11–15)
FASTING PATIENT LIPID ANSWER: NO
FASTING STATUS PATIENT QL REPORTED: NO
GLOBULIN PLAS-MCNC: 2.9 G/DL (ref 2–3.5)
GLUCOSE BLD-MCNC: 78 MG/DL (ref 70–99)
HCT VFR BLD AUTO: 41.1 %
HDLC SERPL-MCNC: 49 MG/DL (ref 40–59)
HGB BLD-MCNC: 13.4 G/DL
LDLC SERPL CALC-MCNC: 173 MG/DL (ref ?–100)
MCH RBC QN AUTO: 29.5 PG (ref 26–34)
MCHC RBC AUTO-ENTMCNC: 32.6 G/DL (ref 31–37)
MCV RBC AUTO: 90.5 FL
NONHDLC SERPL-MCNC: 201 MG/DL (ref ?–130)
OSMOLALITY SERPL CALC.SUM OF ELEC: 291 MOSM/KG (ref 275–295)
PLATELET # BLD AUTO: 263 10(3)UL (ref 150–450)
PLATELETS.RETICULATED NFR BLD AUTO: 12.3 % (ref 0–7)
POTASSIUM SERPL-SCNC: 4.2 MMOL/L (ref 3.5–5.1)
PROT SERPL-MCNC: 8.2 G/DL (ref 5.7–8.2)
RBC # BLD AUTO: 4.54 X10(6)UL
SODIUM SERPL-SCNC: 141 MMOL/L (ref 136–145)
TRIGL SERPL-MCNC: 151 MG/DL (ref 30–149)
TSI SER-ACNC: 1.81 UIU/ML (ref 0.55–4.78)
VIT D+METAB SERPL-MCNC: 75 NG/ML (ref 30–100)
VLDLC SERPL CALC-MCNC: 30 MG/DL (ref 0–30)
WBC # BLD AUTO: 6.8 X10(3) UL (ref 4–11)

## 2025-01-11 PROCEDURE — 80053 COMPREHEN METABOLIC PANEL: CPT

## 2025-01-11 PROCEDURE — 85027 COMPLETE CBC AUTOMATED: CPT

## 2025-01-11 PROCEDURE — 82671 ASSAY OF ESTROGENS: CPT

## 2025-01-11 PROCEDURE — 84443 ASSAY THYROID STIM HORMONE: CPT

## 2025-01-11 PROCEDURE — 36415 COLL VENOUS BLD VENIPUNCTURE: CPT

## 2025-01-11 PROCEDURE — 82306 VITAMIN D 25 HYDROXY: CPT

## 2025-01-11 PROCEDURE — 80061 LIPID PANEL: CPT

## 2025-01-11 RX ORDER — HYDROCODONE BITARTRATE AND ACETAMINOPHEN 5; 325 MG/1; MG/1
1 TABLET ORAL EVERY 4 HOURS PRN
COMMUNITY
Start: 2024-12-03 | End: 2025-01-11 | Stop reason: ALTCHOICE

## 2025-01-11 RX ORDER — AMOXICILLIN 500 MG/1
CAPSULE ORAL
COMMUNITY
Start: 2024-12-03 | End: 2025-01-11 | Stop reason: ALTCHOICE

## 2025-01-11 NOTE — PROGRESS NOTES
Mariza Cullen is a 39 year old female.  Chief Complaint   Patient presents with    Physical     Declines flu vaccine       HPI:   Patient comes for annual physical- last seen 10/2023  C/C annual physical  C/o took medical leave from feb - 2024 through her psychiatrist -dr swapna carter -rec by her therapist -- attended out pt [facility]  Seeing IFS consolers  and psychiatrist   Doing  physical therapy for numbness and tingling in both arms neck and shoulders-started in 2024   Sees her functional medicine doctor Dr. Young        HISTORY  New pt --referred by her BOB tate   C/c establish care -used to see dr demi mcginnis (once) and before that dr virginia harley  Sees a functional dr as well --dr prince young -\"[facility]\"  C/o   Had skin patch on the palm L>R -  Going for pelvic floor therapy for her right hip pain and pelvic pain         PMH  LUIS ALFREDO   High cortisol   H/o  delivery and daughter passed in  at 7mn old   Hypothyroidism   Disc herniation - neck and lower back   Migraines   pcos        Lives with  , works in [employer]      Current Outpatient Medications   Medication Sig Dispense Refill    Cholecalciferol (VITAMIN D3) 250 MCG (56839 UT) Oral Cap Take by mouth.      hydrOXYzine 10 MG Oral Tab Take 1 tablet (10 mg total) by mouth daily.      gabapentin 100 MG Oral Cap Take 2 capsules (200 mg total) by mouth daily.      NALTREXONE HCL OR Take 3 mg by mouth daily.      levothyroxine 88 MCG Oral Tab Take 1 tablet (88 mcg total) by mouth. Levoxyl given by dr prince shore      Lactobacillus (PROBIOTIC ACIDOPHILUS OR) Take by mouth. probioMed      Omega-3 Fatty Acids (FISH OIL EXTRA STRENGTH OR) Take by mouth.        Past Medical History:    Anxiety    Depression    Hair abnormality    Polycystic disease, ovaries      Past Surgical History:   Procedure Laterality Date    Other surgical history      Osteoid osteoma righ femur  x 3 -most recent 2022,2017x 2, also  dental      Social History:  Social History     Socioeconomic History    Marital status:    Tobacco Use    Smoking status: Never    Smokeless tobacco: Never   Vaping Use    Vaping status: Never Used   Substance and Sexual Activity    Alcohol use: Never    Drug use: Never     Social Drivers of Health     Financial Resource Strain: Not At Risk (12/9/2022)    [facility]    Financial Resource Strain     How hard is it for you to pay for the very basics like food, housing, medical care, and heating?: Not very hard   Food Insecurity: No Food Insecurity (12/9/2022)    [facility], [facility]    Food Insecurity     Currently or in the past 3 months, have you worried your food would run out before you had money to buy more?: No     In the past 12 months, have you run out of food or been unable to get more?: No   Transportation Needs: No Transportation Needs (12/9/2022)    Received from [facility], [facility]    Transportation Needs     Currently or in the past 3 months, has lack of transportation kept you from medical appointments, getting food or medicine, or providing care to a family member?: Unrecognized value     Has the lack of transportation kept you from meetings, work, or from getting things needed for daily living?: Unrecognized value     Medical Transportation Needs?: Patient refused     Daily Living Transportation Needs? [Peds Only] : Patient refused    Received from [facility], [facility]    Social Connections    Received from [facility], [facility]    Housing Stability        REVIEW OF SYSTEMS:   GENERAL HEALTH: No fevers, chills, sweats, + fatigue  VISION: No recent vision problems, blurry vision , +  double vision-- better   HEENT: No decreased hearing ear pain nasal  congestion or sore throat--occ hoarse throat and some stuffiness   SKIN: denies any unusual skin lesions or rashes  RESPIRATORY: denies shortness of breath, cough, wheezing  CARDIOVASCULAR: denies chest pain on exertion, + occ palpitations- - tired to her anxiety , no swelling in feet  GI: denies abdominal pain and denies heartburn, nausea or vomiting  : No Pain on urination, change in the color of urine, discharge, urinating frequently  MUS: + back pain, joint pain, muscle pain- manageable   NEURO: +occ  headaches - has h/o migraines -light and sound sensitivities , + anxiety, +depression-- stable on meds     EXAM:   /76   Pulse 67   Ht 5' 0.5\" (1.537 m)   Wt 129 lb (58.5 kg)   LMP 06/11/2024 (Exact Date)   BMI 24.78 kg/m²   GENERAL: well developed, well nourished,in no apparent distress  SKIN: no rashes,no suspicious lesions  HEENT: atraumatic, normocephalic,ears and throat are clear, no frontal or maxillary sinus tenderness, pupils equal reactive to light bilaterally, extra ocular muscles intact  NECK: supple,no adenopathy, nontender   LUNGS: clear to auscultation, no wheeze  CARDIO: RRR without murmur  GI: good BS's,no masses or tenderness  EXTREMITIES: no cyanosis, or edema    ASSESSMENT AND PLAN:   Diagnoses and all orders for this visit:    Physical exam, annual  -     Comp Metabolic Panel (14); Future  -     Lipid Panel; Future  -     TSH W Reflex To Free T4; Future  -     Vitamin D; Future  -     CBC, Platelet; No Differential; Future  Advised patient to continue to watch what she eats exercise, seatbelt use no texting driving, sunscreen use advised    Cervical spondylosis  Doing PT  Dysautonomia (HCC)  -     Estrogens Fractionated, S [E]; Future  Sees patient on medicine doctor and neurologist and advised her to follow-up with the neurologist especially for the migraines  Vitamin D deficiency  -     Vitamin D; Future  Recheck levels  Chronic pain of multiple joints  -     Rheumatology Referral  - In Network  Will refer to  rheumatology to evaluate.  Her physical therapist wanted her to be evaluated for limits  lax limbs         Preventative medicine   Labs 6/2023 and 11/2024   Pap- vargas gyn -will get the results   Declined flu shot     The patient indicates understanding of these issues and agrees to the plan.  No follow-ups on file.

## 2025-01-16 LAB
ESTRADIOL: 30 PG/ML
ESTRONE: 24 PG/ML

## 2025-02-25 ENCOUNTER — MED REC SCAN ONLY (OUTPATIENT)
Dept: PHYSICAL MEDICINE AND REHAB | Facility: CLINIC | Age: 40
End: 2025-02-25

## 2025-04-25 ENCOUNTER — TELEPHONE (OUTPATIENT)
Dept: INTERNAL MEDICINE CLINIC | Facility: CLINIC | Age: 40
End: 2025-04-25

## 2025-05-29 ENCOUNTER — NURSE TRIAGE (OUTPATIENT)
Dept: INTERNAL MEDICINE CLINIC | Facility: CLINIC | Age: 40
End: 2025-05-29

## 2025-06-03 ENCOUNTER — TELEMEDICINE (OUTPATIENT)
Dept: INTERNAL MEDICINE CLINIC | Facility: CLINIC | Age: 40
End: 2025-06-03
Payer: COMMERCIAL

## 2025-06-03 ENCOUNTER — MED REC SCAN ONLY (OUTPATIENT)
Dept: PHYSICAL MEDICINE AND REHAB | Facility: CLINIC | Age: 40
End: 2025-06-03

## 2025-06-03 DIAGNOSIS — B34.9 RECURRENT VIRAL INFECTION: Primary | ICD-10-CM

## 2025-06-03 PROCEDURE — 98005 SYNCH AUDIO-VIDEO EST LOW 20: CPT | Performed by: INTERNAL MEDICINE

## 2025-06-03 NOTE — PROGRESS NOTES
Patient ID: Mariza Cullen is a 40 year old female.  No chief complaint on file.         HISTORY OF PRESENT ILLNESS:   Patient presents for above.  This visit is conducted using Telemedicine with live, interactive video and audio.  C/c follow since last visit in jan   C/o had covid 1/30/2025 to 2/15/2025 (fever sweats sinus sympt ) in jan and has been having \"long covid \" since then - severe fatigue and severe migraines and needed ivermectin - 2 rounds and it helped   Her functioning medicine dr put her on methalamine blue  -  Both treatment  in feb and then in march   3/3/2025 had a sinus inf and did nasal washes but ended up needing abx and got   better   In April again got sick   May 28th again got sick - body aches and jnt pains sneezing fatgue   Taking Liposomal glutathione , vit d zinc, and viracid by orthomolecular   Has been sick so working remotely   She spoke to her functional medicine dr about her FLP too and she got a larger panel done /blood draw and was thinking it was due to inflammation and pt changed her diet and was seen by nutritionist   Working on her hydration but she does need to drink more although she does drink water when taking her meds       Review of Systems   Ten point review of systems otherwise negative with the exception of HPI and assessment and plan.    MEDICAL HISTORY:     Past Medical History[1]    Past Surgical History[2]    Medications - Current[3]    Allergies:Allergies[4]    Social History     Socioeconomic History    Marital status:      Spouse name: Not on file    Number of children: Not on file    Years of education: Not on file    Highest education level: Not on file   Occupational History    Not on file   Tobacco Use    Smoking status: Never    Smokeless tobacco: Never   Vaping Use    Vaping status: Never Used   Substance and Sexual Activity    Alcohol use: Never    Drug use: Never    Sexual activity: Not on file   Other Topics Concern    Not on file   Social  History Narrative    Not on file     Social Drivers of Health     Food Insecurity: No Food Insecurity (12/9/2022)    Received from [facility]    Food Insecurity     Currently or in the past 3 months, have you worried your food would run out before you had money to buy more?: No     In the past 12 months, have you run out of food or been unable to get more?: No   Transportation Needs: No Transportation Needs (12/9/2022)    Received from [facility]    Transportation Needs     Currently or in the past 3 months, has lack of transportation kept you from medical appointments, getting food or medicine, or providing care to a family member?: Patient Refused     Has the lack of transportation kept you from meetings, work, or from getting things needed for daily living?: Patient Refused     Medical Transportation Needs?: Patient refused     Daily Living Transportation Needs? [Peds Only] : Patient refused   Stress: Not on file   Housing Stability: Low Risk  (12/17/2023)    Received from [facility]    Housing Stability     Mortgage Payment Concerns?: Not on file     Number of Places Lived in the Last Year: Not on file     Unstable Housing?: Not on file       PHYSICAL EXAM:   Unable to perform vitals or do physical exam as this is a virtual video visit.  Patient appears alert and oriented x 3, no acute distress  Patient speaking in complete sentences without any conversational dyspnea no respiratory distress  No coughing heard    ASSESSMENT/PLAN:   1. Recurrent viral infection    She is seeing her psychiatrist q2 mns and she is weaning of the hydroxyzine as per pt request   May benefit for from seeing an immunologist at a research center due to recurrent inf   ?compliment def      No follow-ups on file.    Time spent on encounter  22 minutes   Video time 22 minutes   Documentation time 22 minutes     Mariza Cullen understands video evaluation is not a substitute  for face-to-face examination or emergency care. Patient advised to go to ER or call 911 for worsening symptoms or acute distress.     Telehealth outside of [facility]  Telehealth Verbal Consent   I conducted a telehealth visit with Mariza Cullen today, 06/03/25, which was completed using two-way, real-time interactive audio and video communication. This has been done in good mercedes to provide continuity of care in the best interest of the provider-patient relationship, due to the COVID -19 public health crisis/national emergency where restrictions of face-to-face office visits are ongoing. Every conscious effort was taken to allow for sufficient and adequate time to complete the visit.  The patient was made aware of the limitations of the telehealth visit, including treatment limitations as no physical exam could be performed.  The patient was advised to call 911 or to go to the ER in case there was an emergency.  The patient was also advised of the potential privacy & security concerns related to the telehealth platform.   The patient was made aware of where to find [facility]'s notice of privacy practices, telehealth consent form and other related consent forms and documents.  which are located on the [facility] website. The patient verbally agreed to telehealth consent form, related consents and the risks discussed.    Lastly, the patient confirmed that they were in [place].   Included in this visit, time may have been spent reviewing labs, medications, radiology tests and decision making. Appropriate medical decision-making and tests are ordered as detailed in the plan of care above.  Coding/billing information is submitted for this visit based on complexity of care and/or time spent for the visit.    This note was prepared using Dragon Medical voice recognition dictation software. As a result errors may occur. When identified these errors have been corrected. While every attempt is made to correct errors during dictation  discrepancies may still exist.    Lois Genao MD  6/3/2025         [1]   Past Medical History:   Anxiety    Depression    Hair abnormality    Polycystic disease, ovaries   [2]   Past Surgical History:  Procedure Laterality Date    Other surgical history      Osteoid osteoma righ femur  x 3 -most recent 12/2022,2017x 2, also dental   [3]   Current Outpatient Medications:     levothyroxine 88 MCG Oral Tab, Take 1 tablet (88 mcg total) by mouth. Levoxyl given by dr prince shore, Disp: , Rfl:     Lactobacillus (PROBIOTIC ACIDOPHILUS OR), Take by mouth. probioMed, Disp: , Rfl:     Omega-3 Fatty Acids (FISH OIL EXTRA STRENGTH OR), Take by mouth., Disp: , Rfl:     hydrOXYzine 10 MG Oral Tab, Take 1 tablet (10 mg total) by mouth daily., Disp: , Rfl:     gabapentin 100 MG Oral Cap, Take 2 capsules (200 mg total) by mouth daily., Disp: , Rfl:     NALTREXONE HCL OR, Take 3 mg by mouth daily., Disp: , Rfl:   [4]   Allergies  Allergen Reactions    Dander HIVES    Dust HIVES    Garlic HIVES    Gluten Meal HIVES    Grass Coughing, OTHER (SEE COMMENTS), SWELLING and Runny nose    Orange HIVES and OTHER (SEE COMMENTS)    Other HIVES and UNKNOWN     MSG    Grass, trees, bushes    Corn NAUSEA AND VOMITING    Tomato NAUSEA AND VOMITING, MYALGIA and OTHER (SEE COMMENTS)     nausea    Monosodium Glutamate OTHER (SEE COMMENTS)     states she had a gran mal seizure r/t msg consumption    Peas UNKNOWN    Corn Oil DIARRHEA    Soy Allergy DIARRHEA

## 2025-08-20 ENCOUNTER — MED REC SCAN ONLY (OUTPATIENT)
Dept: PHYSICAL MEDICINE AND REHAB | Facility: CLINIC | Age: 40
End: 2025-08-20